# Patient Record
Sex: FEMALE | Race: WHITE | NOT HISPANIC OR LATINO | Employment: UNEMPLOYED | URBAN - METROPOLITAN AREA
[De-identification: names, ages, dates, MRNs, and addresses within clinical notes are randomized per-mention and may not be internally consistent; named-entity substitution may affect disease eponyms.]

---

## 2023-10-01 PROBLEM — N95.1 MENOPAUSAL SYMPTOM: Status: ACTIVE | Noted: 2023-10-01

## 2023-10-01 PROBLEM — S66.819A SPRAIN AND STRAIN OF INTERPHALANGEAL (JOINT) OF HAND: Status: ACTIVE | Noted: 2023-10-01

## 2023-10-01 PROBLEM — S69.90XA FINGER INJURY: Status: ACTIVE | Noted: 2023-10-01

## 2023-10-01 PROBLEM — E88.810 DYSMETABOLIC SYNDROME X: Status: ACTIVE | Noted: 2023-10-01

## 2023-10-01 PROBLEM — S63.639A SPRAIN AND STRAIN OF INTERPHALANGEAL (JOINT) OF HAND: Status: ACTIVE | Noted: 2023-10-01

## 2023-10-01 PROBLEM — L90.0 LICHEN SCLEROSUS: Status: ACTIVE | Noted: 2023-08-09

## 2023-10-01 PROBLEM — N64.4 BREAST PAIN: Status: ACTIVE | Noted: 2023-10-01

## 2023-10-01 PROBLEM — L68.0 HIRSUTISM: Status: ACTIVE | Noted: 2023-08-09

## 2023-10-01 PROBLEM — M51.369 LUMBAR DEGENERATIVE DISC DISEASE: Status: ACTIVE | Noted: 2023-10-01

## 2023-10-01 PROBLEM — R60.9 EDEMA: Status: ACTIVE | Noted: 2023-10-01

## 2023-10-01 PROBLEM — E78.5 DYSLIPIDEMIA: Status: ACTIVE | Noted: 2023-10-01

## 2023-10-01 PROBLEM — J32.2 CHRONIC ETHMOIDAL SINUSITIS: Status: ACTIVE | Noted: 2023-10-01

## 2023-10-01 PROBLEM — M54.9 BACK PAIN, CHRONIC: Status: ACTIVE | Noted: 2023-10-01

## 2023-10-01 PROBLEM — F33.40 RECURRENT MAJOR DEPRESSIVE DISORDER IN REMISSION (CMS-HCC): Status: ACTIVE | Noted: 2023-10-01

## 2023-10-01 PROBLEM — G89.29 BACK PAIN, CHRONIC: Status: ACTIVE | Noted: 2023-10-01

## 2023-10-01 PROBLEM — M25.569 JOINT PAIN, KNEE: Status: ACTIVE | Noted: 2023-10-01

## 2023-10-01 PROBLEM — N64.89 PENDULOUS BREAST: Status: ACTIVE | Noted: 2023-10-01

## 2023-10-01 PROBLEM — K21.9 ESOPHAGEAL REFLUX: Status: ACTIVE | Noted: 2023-10-01

## 2023-10-01 PROBLEM — F41.0 PANIC ATTACKS: Status: ACTIVE | Noted: 2023-10-01

## 2023-10-01 PROBLEM — M25.551 RIGHT HIP PAIN: Status: ACTIVE | Noted: 2023-10-01

## 2023-10-01 PROBLEM — E05.90 HYPERTHYROIDISM: Status: ACTIVE | Noted: 2023-10-01

## 2023-10-01 PROBLEM — M25.511 RIGHT SHOULDER PAIN: Status: ACTIVE | Noted: 2023-10-01

## 2023-10-01 PROBLEM — H81.10 BPPV (BENIGN PAROXYSMAL POSITIONAL VERTIGO): Status: ACTIVE | Noted: 2023-10-01

## 2023-10-01 PROBLEM — M54.9 BACKACHE: Status: ACTIVE | Noted: 2023-10-01

## 2023-10-01 PROBLEM — I82.409 DVT (DEEP VENOUS THROMBOSIS) (MULTI): Status: ACTIVE | Noted: 2023-10-01

## 2023-10-01 PROBLEM — R00.2 HEART PALPITATIONS: Status: ACTIVE | Noted: 2023-10-01

## 2023-10-01 PROBLEM — N64.4 BREAST PAIN: Status: RESOLVED | Noted: 2023-10-01 | Resolved: 2023-10-01

## 2023-10-01 PROBLEM — M67.951 TENDINOPATHY OF RIGHT GLUTEUS MEDIUS: Status: ACTIVE | Noted: 2023-10-01

## 2023-10-01 PROBLEM — F41.9 ANXIETY: Status: ACTIVE | Noted: 2023-10-01

## 2023-10-01 PROBLEM — F32.A DEPRESSION: Status: ACTIVE | Noted: 2023-10-01

## 2023-10-01 PROBLEM — I87.1 COMPRESSION OF VEIN: Status: ACTIVE | Noted: 2023-10-01

## 2023-10-01 PROBLEM — G47.00 INSOMNIA: Status: ACTIVE | Noted: 2023-10-01

## 2023-10-01 PROBLEM — N63.20 LEFT BREAST MASS: Status: ACTIVE | Noted: 2023-10-01

## 2023-10-01 PROBLEM — B35.1 TINEA UNGUIUM: Status: ACTIVE | Noted: 2023-08-09

## 2023-10-01 PROBLEM — J34.2 DEVIATED NASAL SEPTUM: Status: ACTIVE | Noted: 2023-10-01

## 2023-10-01 PROBLEM — J31.0 CHRONIC RHINITIS: Status: ACTIVE | Noted: 2023-10-01

## 2023-10-01 PROBLEM — R92.8 ABNORMAL MAMMOGRAM: Status: ACTIVE | Noted: 2023-10-01

## 2023-10-01 PROBLEM — E05.90 SUBCLINICAL HYPERTHYROIDISM: Status: ACTIVE | Noted: 2023-10-01

## 2023-10-01 PROBLEM — U07.1 DISEASE DUE TO SEVERE ACUTE RESPIRATORY SYNDROME CORONAVIRUS 2 (SARS-COV-2): Status: ACTIVE | Noted: 2023-10-01

## 2023-10-01 PROBLEM — M25.512 LEFT SHOULDER PAIN: Status: ACTIVE | Noted: 2023-10-01

## 2023-10-01 PROBLEM — N90.89 VULVAR MASS: Status: ACTIVE | Noted: 2023-10-01

## 2023-10-01 PROBLEM — I10 HYPERTENSION: Status: ACTIVE | Noted: 2023-10-01

## 2023-10-01 PROBLEM — M79.606 PAIN OF LOWER EXTREMITY: Status: ACTIVE | Noted: 2023-10-01

## 2023-10-01 PROBLEM — D18.01 HEMANGIOMA OF SKIN AND SUBCUTANEOUS TISSUE: Status: ACTIVE | Noted: 2023-08-09

## 2023-10-01 PROBLEM — D22.70 MELANOCYTIC NEVI OF LOWER LIMB, INCLUDING HIP: Status: ACTIVE | Noted: 2023-08-09

## 2023-10-01 PROBLEM — R73.02 IGT (IMPAIRED GLUCOSE TOLERANCE): Status: ACTIVE | Noted: 2023-10-01

## 2023-10-01 PROBLEM — E28.2 POLYCYSTIC OVARIAN SYNDROME: Status: ACTIVE | Noted: 2023-10-01

## 2023-10-01 PROBLEM — N95.2 VAGINAL ATROPHY: Status: ACTIVE | Noted: 2023-10-01

## 2023-10-01 PROBLEM — R09.82 POSTNASAL DRIP: Status: ACTIVE | Noted: 2023-10-01

## 2023-10-01 PROBLEM — K64.9 HEMORRHOIDS: Status: ACTIVE | Noted: 2023-10-01

## 2023-10-01 PROBLEM — R07.89 ATYPICAL CHEST PAIN: Status: ACTIVE | Noted: 2023-10-01

## 2023-10-01 PROBLEM — Z04.9 CONDITION NOT FOUND: Status: ACTIVE | Noted: 2023-10-01

## 2023-10-01 PROBLEM — M54.32 SCIATICA OF LEFT SIDE: Status: ACTIVE | Noted: 2023-10-01

## 2023-10-01 PROBLEM — L82.1 OTHER SEBORRHEIC KERATOSIS: Status: ACTIVE | Noted: 2023-08-09

## 2023-10-01 PROBLEM — M54.16 LUMBAR RADICULOPATHY, RIGHT: Status: ACTIVE | Noted: 2023-10-01

## 2023-10-01 PROBLEM — M25.519 SHOULDER PAIN: Status: ACTIVE | Noted: 2023-10-01

## 2023-10-01 PROBLEM — R39.9 UTI SYMPTOMS: Status: ACTIVE | Noted: 2023-10-01

## 2023-10-01 PROBLEM — L72.3 SEBACEOUS CYST: Status: ACTIVE | Noted: 2023-10-01

## 2023-10-01 PROBLEM — L81.4 OTHER MELANIN HYPERPIGMENTATION: Status: ACTIVE | Noted: 2023-08-09

## 2023-10-01 PROBLEM — N95.1 HOT FLASHES DUE TO MENOPAUSE: Status: ACTIVE | Noted: 2023-10-01

## 2023-10-01 PROBLEM — H73.892: Status: ACTIVE | Noted: 2023-10-01

## 2023-10-01 PROBLEM — M79.2 NEUROPATHIC PAIN: Status: ACTIVE | Noted: 2023-10-01

## 2023-10-01 PROBLEM — E66.9 OBESITY: Status: ACTIVE | Noted: 2023-10-01

## 2023-10-01 PROBLEM — D22.5 MELANOCYTIC NEVI OF TRUNK: Status: ACTIVE | Noted: 2023-08-09

## 2023-10-01 PROBLEM — S62.609A: Status: ACTIVE | Noted: 2023-10-01

## 2023-10-01 PROBLEM — N95.1 MENOPAUSAL STATE: Status: ACTIVE | Noted: 2023-10-01

## 2023-10-01 PROBLEM — M75.80 ROTATOR CUFF TENDONITIS: Status: ACTIVE | Noted: 2023-10-01

## 2023-10-01 PROBLEM — D22.60 MELANOCYTIC NEVI OF UNSPECIFIED UPPER LIMB, INCLUDING SHOULDER: Status: ACTIVE | Noted: 2023-08-09

## 2023-10-01 PROBLEM — M51.36 LUMBAR DEGENERATIVE DISC DISEASE: Status: ACTIVE | Noted: 2023-10-01

## 2023-10-01 RX ORDER — HYDROCORTISONE 25 MG/G
CREAM TOPICAL
COMMUNITY
Start: 2020-10-26

## 2023-10-01 RX ORDER — HYDROCHLOROTHIAZIDE 25 MG/1
1 TABLET ORAL DAILY
COMMUNITY
Start: 2021-01-04

## 2023-10-01 RX ORDER — ESTRADIOL 0.1 MG/G
CREAM VAGINAL DAILY
COMMUNITY
End: 2023-11-03 | Stop reason: SDUPTHER

## 2023-10-01 RX ORDER — SEMAGLUTIDE 1.34 MG/ML
INJECTION, SOLUTION SUBCUTANEOUS
COMMUNITY
Start: 2023-04-21

## 2023-10-01 RX ORDER — SEMAGLUTIDE 1.34 MG/ML
0.5 INJECTION, SOLUTION SUBCUTANEOUS
COMMUNITY
Start: 2022-07-29 | End: 2023-10-04 | Stop reason: ALTCHOICE

## 2023-10-01 RX ORDER — TERBINAFINE HYDROCHLORIDE 250 MG/1
TABLET ORAL
COMMUNITY
Start: 2023-08-16 | End: 2023-10-04 | Stop reason: ALTCHOICE

## 2023-10-01 RX ORDER — ASPIRIN 81 MG/1
TABLET ORAL
COMMUNITY
Start: 2019-05-22

## 2023-10-01 RX ORDER — HYDROXYZINE PAMOATE 25 MG/1
CAPSULE ORAL
COMMUNITY
Start: 2023-01-26 | End: 2023-10-04 | Stop reason: ALTCHOICE

## 2023-10-01 RX ORDER — CHOLECALCIFEROL (VITAMIN D3) 25 MCG
25 TABLET ORAL DAILY
COMMUNITY
End: 2023-10-04 | Stop reason: ALTCHOICE

## 2023-10-01 RX ORDER — MELOXICAM 15 MG/1
1 TABLET ORAL DAILY PRN
COMMUNITY
Start: 2022-10-07 | End: 2023-10-04 | Stop reason: ALTCHOICE

## 2023-10-01 RX ORDER — ESOMEPRAZOLE MAGNESIUM 40 MG/1
40 CAPSULE, DELAYED RELEASE ORAL DAILY
COMMUNITY
End: 2023-11-06 | Stop reason: SDUPTHER

## 2023-10-01 RX ORDER — CALCIUM CARBONATE/VITAMIN D3 600 MG-10
TABLET ORAL
COMMUNITY
End: 2023-10-04 | Stop reason: ALTCHOICE

## 2023-10-01 RX ORDER — CLOBETASOL PROPIONATE 0.5 MG/G
CREAM TOPICAL
COMMUNITY
Start: 2020-09-30

## 2023-10-01 RX ORDER — ATORVASTATIN CALCIUM 10 MG/1
1 TABLET, FILM COATED ORAL NIGHTLY
COMMUNITY
Start: 2021-05-24 | End: 2024-03-04 | Stop reason: SDUPTHER

## 2023-10-01 RX ORDER — GABAPENTIN 300 MG/1
300 CAPSULE ORAL 3 TIMES DAILY
COMMUNITY
Start: 2022-10-06 | End: 2023-10-04 | Stop reason: ALTCHOICE

## 2023-10-01 RX ORDER — FLUTICASONE PROPIONATE 50 MCG
2 SPRAY, SUSPENSION (ML) NASAL DAILY PRN
COMMUNITY
Start: 2022-09-01

## 2023-10-01 RX ORDER — RAMELTEON 8 MG/1
TABLET ORAL
COMMUNITY
Start: 2021-07-21 | End: 2023-10-04 | Stop reason: ALTCHOICE

## 2023-10-01 RX ORDER — MULTIVITAMIN
1 TABLET ORAL DAILY
COMMUNITY
Start: 2017-02-20

## 2023-10-01 RX ORDER — ALPRAZOLAM 0.5 MG/1
0.25 TABLET ORAL DAILY PRN
COMMUNITY
Start: 2022-09-30 | End: 2023-10-04 | Stop reason: HOSPADM

## 2023-10-01 RX ORDER — HYDROXYZINE HYDROCHLORIDE 25 MG/1
1-2 TABLET, FILM COATED ORAL NIGHTLY PRN
COMMUNITY
Start: 2022-05-27 | End: 2023-10-04 | Stop reason: ALTCHOICE

## 2023-10-01 RX ORDER — POTASSIUM CHLORIDE 750 MG/1
1 TABLET, EXTENDED RELEASE ORAL DAILY
COMMUNITY
Start: 2021-05-24

## 2023-10-01 RX ORDER — ESTRADIOL 10 UG/1
INSERT VAGINAL
COMMUNITY
Start: 2022-12-28 | End: 2023-10-04 | Stop reason: ALTCHOICE

## 2023-10-01 RX ORDER — OXYBUTYNIN CHLORIDE 5 MG/1
TABLET ORAL
COMMUNITY
Start: 2023-03-30 | End: 2024-03-04 | Stop reason: SDUPTHER

## 2023-10-01 RX ORDER — MELOXICAM 7.5 MG/1
7.5 TABLET ORAL 2 TIMES DAILY
COMMUNITY
Start: 2022-01-21 | End: 2023-10-04 | Stop reason: ALTCHOICE

## 2023-10-01 RX ORDER — LOSARTAN POTASSIUM 25 MG/1
25 TABLET ORAL DAILY
COMMUNITY
Start: 2019-02-01 | End: 2023-10-04 | Stop reason: ALTCHOICE

## 2023-10-04 ENCOUNTER — OFFICE VISIT (OUTPATIENT)
Dept: OBSTETRICS AND GYNECOLOGY | Facility: CLINIC | Age: 58
End: 2023-10-04
Payer: COMMERCIAL

## 2023-10-04 VITALS
HEIGHT: 64 IN | DIASTOLIC BLOOD PRESSURE: 60 MMHG | WEIGHT: 148 LBS | SYSTOLIC BLOOD PRESSURE: 110 MMHG | BODY MASS INDEX: 25.27 KG/M2

## 2023-10-04 DIAGNOSIS — N90.89 PERINEAL CYST IN FEMALE: Primary | ICD-10-CM

## 2023-10-04 DIAGNOSIS — Z71.89 SURGICAL COUNSELING VISIT: ICD-10-CM

## 2023-10-04 PROCEDURE — 1036F TOBACCO NON-USER: CPT | Performed by: STUDENT IN AN ORGANIZED HEALTH CARE EDUCATION/TRAINING PROGRAM

## 2023-10-04 PROCEDURE — 3074F SYST BP LT 130 MM HG: CPT | Performed by: STUDENT IN AN ORGANIZED HEALTH CARE EDUCATION/TRAINING PROGRAM

## 2023-10-04 PROCEDURE — 99204 OFFICE O/P NEW MOD 45 MIN: CPT | Performed by: STUDENT IN AN ORGANIZED HEALTH CARE EDUCATION/TRAINING PROGRAM

## 2023-10-04 PROCEDURE — 3078F DIAST BP <80 MM HG: CPT | Performed by: STUDENT IN AN ORGANIZED HEALTH CARE EDUCATION/TRAINING PROGRAM

## 2023-10-04 ASSESSMENT — PAIN SCALES - GENERAL: PAINLEVEL: 0-NO PAIN

## 2023-10-04 NOTE — PROGRESS NOTES
"Division of Minimally Invasive Gynecologic Surgery  [facility]    10/04/23 Gynecology Consult     HISTORY OF PRESENT ILLNESS:  Edna Dawson 58 y.o. w/ hx of DVT and known lichen sclerosis presents in referral from Rad Garcia for small bump in soft tissue of right vaginal canal. She also inquired today about risk-reducing hysterectomy/oophorectomy.      She reports she first noticed this in August of this year. It is \"about the size of a pea\" and hard. No change in size since detection and no pain or tenderness.     PAST MEDICAL HISTORY:  Past Medical History:   Diagnosis Date    Metabolic syndrome     Dysmetabolic syndrome X    Personal history of other diseases of the digestive system     History of gastroesophageal reflux (GERD)    Personal history of other diseases of the musculoskeletal system and connective tissue     History of arthritis    Personal history of other venous thrombosis and embolism 2015    History of deep venous thrombosis       PAST SURGICAL HISTORY:  Past Surgical History:   Procedure Laterality Date    APPENDECTOMY  2013    Appendectomy    BLADDER SURGERY  2015    Bladder Surgery     SECTION, CLASSIC  2015     Section    OTHER SURGICAL HISTORY  2019    Dilation and curettage    OTHER SURGICAL HISTORY  2019    [place] tooth extraction       SOCIAL HISTORY:  Social History     Tobacco Use    Smoking status: Never    Smokeless tobacco: Never   Substance Use Topics    Drug use: Never       PHYSICAL EXAMINATION:  VITAL SIGNS:  /60 (BP Location: Right arm)   Ht 1.626 m (5' 4\")   Wt 67.1 kg (148 lb)   BMI 25.40 kg/m²       Constitutional:  No acute distress, well-nourished and well-developed  HEENT: EOM grossly intact, MMM, neck supple and with full ROM  Pulm:  Effort normal. No accessory muscle usage.  No respiratory distress.  Abd: soft, non-distended, non-tender, no palpable masses  :  - EGBUS: " grossly WNL, subcentimeter firm/non-tender/mobile mass palpated within soft tissue of right vaginal canal near introital edge  - Bimanual: No adnexal fullness or tenderness, no CMT, uterus is small, mobile, non-tender, no fullness or nodularity of parametria, bilateral lymph nodes WNL   Neurological:  She is alert and oriented to person place and time.  Skin: Warm, no pallor.  Psychiatric:  She has normal mood and affect    ASSESSMENT:    Problem List Items Addressed This Visit    None  Visit Diagnoses       Perineal cyst in female    -  Primary    Surgical counseling visit                Edna Dawson 58 y.o. w/ hx of DVT and known lichen sclerosis presents in referral from Rad Garcia for small bump in soft tissue of right vaginal canal. She also inquired today about risk-reducing hysterectomy/oophorectomy.      Reviewed findings on exam today, mass likely benign given exam features. Encouraged bimonthly self-exam for monitoring, with consideration for repeat visit/CT imaging if she notices new findings such as pain, bleeding, or an increase in size.     Discussed typical indications for risk-reducing TLH-BSO. She does not have a significant family history to increase this risk, nor does she have a genetic syndrome. In addition, surgery would pose a risk of recurrent DVT for her with possibility of PE. We also discussed that ovarian function is still beneficial for cardiovascular/bone/brain health up until age 65. She expressed understanding, will defer TLH-BSO.     Time for chart review, consultation, and documentation today was 45 minutes with the majority of the time spent in counseling.     Billie John MD  10/04/23  9:18 AM

## 2023-10-13 DIAGNOSIS — Z00.00 ENCOUNTER FOR PREVENTIVE HEALTH EXAMINATION: Primary | ICD-10-CM

## 2023-11-01 ENCOUNTER — OFFICE VISIT (OUTPATIENT)
Dept: OBSTETRICS AND GYNECOLOGY | Facility: CLINIC | Age: 58
End: 2023-11-01
Payer: COMMERCIAL

## 2023-11-01 VITALS
BODY MASS INDEX: 25.78 KG/M2 | SYSTOLIC BLOOD PRESSURE: 120 MMHG | HEIGHT: 64 IN | DIASTOLIC BLOOD PRESSURE: 68 MMHG | WEIGHT: 151 LBS

## 2023-11-01 DIAGNOSIS — Z01.419 ENCOUNTER FOR ANNUAL ROUTINE GYNECOLOGICAL EXAMINATION: Primary | ICD-10-CM

## 2023-11-01 PROCEDURE — 3074F SYST BP LT 130 MM HG: CPT | Performed by: OBSTETRICS & GYNECOLOGY

## 2023-11-01 PROCEDURE — 99396 PREV VISIT EST AGE 40-64: CPT | Performed by: OBSTETRICS & GYNECOLOGY

## 2023-11-01 PROCEDURE — 1036F TOBACCO NON-USER: CPT | Performed by: OBSTETRICS & GYNECOLOGY

## 2023-11-01 PROCEDURE — 3078F DIAST BP <80 MM HG: CPT | Performed by: OBSTETRICS & GYNECOLOGY

## 2023-11-01 ASSESSMENT — PAIN SCALES - GENERAL: PAINLEVEL: 0-NO PAIN

## 2023-11-01 NOTE — PATIENT INSTRUCTIONS
Thanks for coming in today for your annual GYN exam.    Your 2022 Pap was within normal limits with negative HPV testing.  Your next Pap smear is due in 2027.  However, please return to the office once a year for your annual GYN exam.    Have a mammogram performed once a year.    Follow-up with your PCP and other healthcare specialist as needed.    Feel free to call the office with any problems, questions or concerns prior to your next scheduled visit.

## 2023-11-01 NOTE — PROGRESS NOTES
"58-year-old -2-2-3  woman presents today for annual GYN exam.    She has a history of biopsy-proven lichen sclerosis.  She is alternating estradiol and clobetasol for her lichen sclerosis.    GynHx: Menarche began at age 11.  She denies any STIs, PID, abnormal Pap smears or sexual abuse.  She is a history of infertility with assistance to conceive 2 children.  She is sexually active with 1 male partner, her  who had a vasectomy.  She denies any history of sexual abuse.    OBHx:  x1 at 35 weeks followed by  section at 33 weeks for placenta previa.  She then had an SAB at 6 weeks and then a  at 38 weeks.  She had another S AB managed with D&C.      Objective   /68   Ht 1.626 m (5' 4\")   Wt 68.5 kg (151 lb)   LMP 2022   BMI 25.92 kg/m²     General:   alert and oriented, in no acute distress, appears stated age, and cooperative   Heart: regular rate and rhythm, S1, S2 normal, no murmur, click, rub or gallop   Lungs: clear to auscultation bilaterally   Abdomen: soft, non-tender, without masses or organomegaly   Vulva: Bartholin's, Urethra, Rotan's normal, 4mm, NT, firm cyst of the inner right labial minora   Vagina: normal mucosa   Cervix: no cervical motion tenderness   Uterus: normal shape and consistency   Adnexa: normal adnexa   Breast: WNL    APE     -  Pap due     -  Mammogram     -  Maintain Clobetasol and E2  "

## 2023-11-03 DIAGNOSIS — N95.2 VAGINAL ATROPHY: Primary | ICD-10-CM

## 2023-11-03 RX ORDER — ESTRADIOL 0.1 MG/G
CREAM VAGINAL
Qty: 42.5 G | Refills: 2 | Status: SHIPPED | OUTPATIENT
Start: 2023-11-03 | End: 2024-06-06 | Stop reason: SDUPTHER

## 2023-11-06 ENCOUNTER — OFFICE VISIT (OUTPATIENT)
Dept: PRIMARY CARE | Facility: CLINIC | Age: 58
End: 2023-11-06
Payer: COMMERCIAL

## 2023-11-06 VITALS
OXYGEN SATURATION: 99 % | BODY MASS INDEX: 25.93 KG/M2 | SYSTOLIC BLOOD PRESSURE: 120 MMHG | WEIGHT: 151.9 LBS | DIASTOLIC BLOOD PRESSURE: 80 MMHG | HEIGHT: 64 IN | HEART RATE: 66 BPM | RESPIRATION RATE: 16 BRPM

## 2023-11-06 DIAGNOSIS — I10 PRIMARY HYPERTENSION: ICD-10-CM

## 2023-11-06 DIAGNOSIS — K21.9 GASTROESOPHAGEAL REFLUX DISEASE WITHOUT ESOPHAGITIS: Primary | ICD-10-CM

## 2023-11-06 PROCEDURE — 3074F SYST BP LT 130 MM HG: CPT | Performed by: INTERNAL MEDICINE

## 2023-11-06 PROCEDURE — 99214 OFFICE O/P EST MOD 30 MIN: CPT | Performed by: INTERNAL MEDICINE

## 2023-11-06 PROCEDURE — 3079F DIAST BP 80-89 MM HG: CPT | Performed by: INTERNAL MEDICINE

## 2023-11-06 PROCEDURE — 1036F TOBACCO NON-USER: CPT | Performed by: INTERNAL MEDICINE

## 2023-11-06 RX ORDER — ESOMEPRAZOLE MAGNESIUM 40 MG/1
40 CAPSULE, DELAYED RELEASE ORAL DAILY
Qty: 90 CAPSULE | Refills: 0 | Status: SHIPPED | OUTPATIENT
Start: 2023-11-06

## 2023-11-06 NOTE — PROGRESS NOTES
"Sick Visit/Cough     Rose is a pleasant 58-year-old female here for evaluation of cough.  She has had a longstanding cough that comes and goes, usually gets worse when she lays flat at bedtime.  Goes away during the day.  No chest pain shortness of breath wheezing.  Has had acid reflux in the past but denies any daily symptoms.  She also has had extensive ENT work-up, has dry nose but denies any daily runny nose, postnasal drip.  No sinus pain.  No chest pain or shortness of breath no orthopnea no PND.  No symptoms with activity.  No abdominal pain.  No nausea vomiting.  No anorexia, no unintentional weight loss.     Blood pressure 120/80, pulse 66, resp. rate 16, height 1.626 m (5' 4\"), weight 68.9 kg (151 lb 14.4 oz), last menstrual period 03/25/2022, SpO2 99 %.    Calm coherent and appropriate    Neck is supple and none tender    Breathing comfortably, clear to auscultation bilaterally    Regular rate rhythm, no murmur gallops or rubs.  Good distal pulses.  No edema.  No JVD.    Abdomen soft and nontender, normal bowel sounds.    Extremities warm well perfused with no clubbing or cyanosis    Cognition intact.      Assessment plan: Rose is a pleasant 58-year-old female here for evaluation of cough, symptoms are consistent with acid reflux versus postnasal drip.  Initially we will start her on esomeprazole 40 mg daily, empty stomach nothing to eat for 40 minutes.  We will give this about 4 weeks to see how she responds, if no improvement will do start her on Flonase.  Call with any new symptoms.    Hypertension: Excellent control, continue HCTZ.    Obesity: Insurance unfortunately no longer appears to be covering Ozempic.  We discussed importance of portion control.  Already has healthy lifestyle otherwise.    Recurrent DVTs: Has had extensive negative work-up.  On aspirin.    Follow-up 3 months.      "

## 2023-11-07 ENCOUNTER — LAB (OUTPATIENT)
Dept: LAB | Facility: LAB | Age: 58
End: 2023-11-07
Payer: COMMERCIAL

## 2023-11-07 DIAGNOSIS — Z00.00 ENCOUNTER FOR PREVENTIVE HEALTH EXAMINATION: ICD-10-CM

## 2023-11-07 LAB
25(OH)D3 SERPL-MCNC: 39 NG/ML (ref 30–100)
ALBUMIN SERPL BCP-MCNC: 4.5 G/DL (ref 3.4–5)
ALP SERPL-CCNC: 81 U/L (ref 33–110)
ALT SERPL W P-5'-P-CCNC: 29 U/L (ref 7–45)
ANION GAP SERPL CALC-SCNC: 14 MMOL/L (ref 10–20)
AST SERPL W P-5'-P-CCNC: 23 U/L (ref 9–39)
BILIRUB SERPL-MCNC: 0.8 MG/DL (ref 0–1.2)
BUN SERPL-MCNC: 16 MG/DL (ref 6–23)
CALCIUM SERPL-MCNC: 10 MG/DL (ref 8.6–10.3)
CHLORIDE SERPL-SCNC: 100 MMOL/L (ref 98–107)
CHOLEST SERPL-MCNC: 179 MG/DL (ref 0–199)
CHOLESTEROL/HDL RATIO: 1.9
CO2 SERPL-SCNC: 30 MMOL/L (ref 21–32)
CREAT SERPL-MCNC: 0.95 MG/DL (ref 0.5–1.05)
ERYTHROCYTE [DISTWIDTH] IN BLOOD BY AUTOMATED COUNT: 12.1 % (ref 11.5–14.5)
EST. AVERAGE GLUCOSE BLD GHB EST-MCNC: 85 MG/DL
GFR SERPL CREATININE-BSD FRML MDRD: 70 ML/MIN/1.73M*2
GLUCOSE SERPL-MCNC: 92 MG/DL (ref 74–99)
HBA1C MFR BLD: 4.6 %
HCT VFR BLD AUTO: 37.6 % (ref 36–46)
HDLC SERPL-MCNC: 92.8 MG/DL
HGB BLD-MCNC: 13.1 G/DL (ref 12–16)
LDLC SERPL CALC-MCNC: 69 MG/DL
MCH RBC QN AUTO: 31.6 PG (ref 26–34)
MCHC RBC AUTO-ENTMCNC: 34.8 G/DL (ref 32–36)
MCV RBC AUTO: 91 FL (ref 80–100)
NON HDL CHOLESTEROL: 86 MG/DL (ref 0–149)
NRBC BLD-RTO: 0 /100 WBCS (ref 0–0)
PLATELET # BLD AUTO: 319 X10*3/UL (ref 150–450)
POTASSIUM SERPL-SCNC: 4.5 MMOL/L (ref 3.5–5.3)
PROT SERPL-MCNC: 6.6 G/DL (ref 6.4–8.2)
RBC # BLD AUTO: 4.15 X10*6/UL (ref 4–5.2)
SODIUM SERPL-SCNC: 139 MMOL/L (ref 136–145)
TRIGL SERPL-MCNC: 85 MG/DL (ref 0–149)
VLDL: 17 MG/DL (ref 0–40)
WBC # BLD AUTO: 6.4 X10*3/UL (ref 4.4–11.3)

## 2023-11-07 PROCEDURE — 85027 COMPLETE CBC AUTOMATED: CPT

## 2023-11-07 PROCEDURE — 36415 COLL VENOUS BLD VENIPUNCTURE: CPT

## 2023-11-07 PROCEDURE — 80061 LIPID PANEL: CPT

## 2023-11-07 PROCEDURE — 80053 COMPREHEN METABOLIC PANEL: CPT

## 2023-11-07 PROCEDURE — 83036 HEMOGLOBIN GLYCOSYLATED A1C: CPT

## 2023-11-07 PROCEDURE — 82306 VITAMIN D 25 HYDROXY: CPT

## 2023-12-22 DIAGNOSIS — Z12.11 ENCOUNTER FOR SCREENING COLONOSCOPY: Primary | ICD-10-CM

## 2024-01-08 ENCOUNTER — OFFICE VISIT (OUTPATIENT)
Dept: ORTHOPEDIC SURGERY | Facility: HOSPITAL | Age: 59
End: 2024-01-08
Payer: COMMERCIAL

## 2024-01-08 DIAGNOSIS — M67.951 TENDINOPATHY OF RIGHT GLUTEUS MEDIUS: Primary | ICD-10-CM

## 2024-01-08 PROCEDURE — 99213 OFFICE O/P EST LOW 20 MIN: CPT | Performed by: EMERGENCY MEDICINE

## 2024-01-08 PROCEDURE — 1036F TOBACCO NON-USER: CPT | Performed by: EMERGENCY MEDICINE

## 2024-01-08 NOTE — PROGRESS NOTES
Subjective   Edna Dawson is a 58 y.o. female who presents for Follow-up of the Right Hip    HPI  1/8/24: Patient returns today for reevaluation for right hip pain.  She was seen on 1/21/2022 and diagnosed with gluteus medius tendinitis.  I did perform a gluteus medius insertion injection for her at that time.  She felt that the injection did provide significant relief.  She was doing quite well up until last month when she developed worsening lateral hip pain.  Her pain has since improved and she is nearly asymptomatic at this time.  She has been working on daily exercises with significant improvement.  No additional complaints.    1/21/22: 57-year-old female presents with complaint of right lateral hip pain that she has had for approximately 9 months. This pain is of gradual onset and intermittent duration, of mild severity. Pain is exacerbated by walking, laying on her right side, and crossing her leg over while performing yoga. Pain is alleviated by rest. Pain is associated with a throbbing and pulling sensation. Pain is associated with acute trauma, deformity, ecchymosis, paresthesias, weakness, rash, erythema, or fevers. She has been taking OTC Motrin, the provides some relief.       ROS: All pertinent positive symptoms are included in the history of present illness.    All other systems have been reviewed and are negative and noncontributory to this patient's current ailments.    Objective     There were no vitals filed for this visit.    Physical Exam  General/Constitutional: No apparent distress. Well-nourished and well developed.  Head: Normocephalic, Atraumatic.   Eyes: EOMI.  Vascular: No edema, swelling or tenderness, except as noted in detailed exam.  Respiratory: Non-labored breathing.  Integumentary: No impressive skin lesions present, except as noted in detailed exam.  Neurological: Alert and oriented.  Psychological: Normal mood and affect.  Musculoskeletal: Normal, except as noted in detailed  exam.     Right Hip: Appearance: Normal. Tenderness: None except the gluteus minimus and greater trochanter and bursa. ROM: Full. Flexion was 5/5. Extension was 5/5. Internal rotation was 5/5. External rotation was 5/5. Abduction was 5/5. Adduction was 5/5. Special Tests: positive Connie's test and positive Blake' test, but negative CARLITOS test, negative impingement test, no joint laxity, negative Sacroiliac Compression test and negative Straight Leg Raise.         Assessment/Plan   Problem List Items Addressed This Visit       Tendinopathy of right gluteus medius - Primary     Considering that she is doing well and is nearly asymptomatic today, I do not feel that additional imaging is necessary.  Examination is reassuring.  She does have mild tenderness over the peritrochanteric region, however feels that she is improving.  Considering this, I do not feel that further intervention is necessary at this time.  I did discuss with patient that she can use topical Voltaren gel as needed for additional pain control.  I would like to see her back in the future if pain returns.  Otherwise, she can progress to all activities as tolerated.    Logan Seth, DO  Sports Medicine  [facility]     ** Please excuse any errors in grammar or translation related to this dictation. Voice recognition software was utilized to prepare this document. **

## 2024-01-22 ENCOUNTER — HOSPITAL ENCOUNTER (OUTPATIENT)
Dept: RADIOLOGY | Facility: CLINIC | Age: 59
Discharge: HOME | End: 2024-01-22
Payer: COMMERCIAL

## 2024-01-22 ENCOUNTER — OFFICE VISIT (OUTPATIENT)
Dept: PRIMARY CARE | Facility: CLINIC | Age: 59
End: 2024-01-22
Payer: COMMERCIAL

## 2024-01-22 VITALS
OXYGEN SATURATION: 97 % | WEIGHT: 157.6 LBS | RESPIRATION RATE: 16 BRPM | HEIGHT: 64 IN | BODY MASS INDEX: 26.91 KG/M2 | DIASTOLIC BLOOD PRESSURE: 90 MMHG | HEART RATE: 64 BPM | SYSTOLIC BLOOD PRESSURE: 140 MMHG

## 2024-01-22 DIAGNOSIS — R05.3 CHRONIC COUGH: ICD-10-CM

## 2024-01-22 DIAGNOSIS — R05.3 CHRONIC COUGH: Primary | ICD-10-CM

## 2024-01-22 PROCEDURE — 99213 OFFICE O/P EST LOW 20 MIN: CPT | Performed by: INTERNAL MEDICINE

## 2024-01-22 PROCEDURE — 3080F DIAST BP >= 90 MM HG: CPT | Performed by: INTERNAL MEDICINE

## 2024-01-22 PROCEDURE — 71046 X-RAY EXAM CHEST 2 VIEWS: CPT

## 2024-01-22 PROCEDURE — 1036F TOBACCO NON-USER: CPT | Performed by: INTERNAL MEDICINE

## 2024-01-22 PROCEDURE — 71046 X-RAY EXAM CHEST 2 VIEWS: CPT | Performed by: RADIOLOGY

## 2024-01-22 PROCEDURE — 3077F SYST BP >= 140 MM HG: CPT | Performed by: INTERNAL MEDICINE

## 2024-01-22 ASSESSMENT — ENCOUNTER SYMPTOMS
CHILLS: 0
SHORTNESS OF BREATH: 0
MYALGIAS: 0
COUGH: 1
SORE THROAT: 0
FEVER: 0
HEMOPTYSIS: 0
WEIGHT LOSS: 0
WHEEZING: 0
RHINORRHEA: 0
HEARTBURN: 0
HEADACHES: 0

## 2024-01-22 NOTE — PROGRESS NOTES
"Sick Visit/Cough     Rose is a pleasant 59-year-old female here for evaluation of cough.  She has had a longstanding cough that comes and goes, happens throughout the day, it is dry, she feels like it is from the tickling of the back of her throat that makes her cough.  But denies any sinus pain, no sinus drainage, does feel like the ears feel full and she has some postnasal drip.  Flonase did not help.  Azelastine did not help.  No chest pain shortness of breath wheezing.  She exercises without any limitation.  No cough with exercise.  She has no reflux-like symptoms.  We did try her on PPI without improvement in symptoms.  She also has had inhaler for presumed cough variant asthma without improvement.  She has had ENT workup in the past, I do not have the access, she has made appointment with Dr. Simental for February.  She feels like a lot of this is coming from her nose.    She denies any fevers or chills.  No night sweats.  No weight loss.  Physically active asymptomatic.  No chest pain shortness of breath.  No wheezing.  No abdominal pain.  No dysphagia anorexia.      Blood pressure 140/90, pulse 64, resp. rate 16, height 1.626 m (5' 4\"), weight 71.5 kg (157 lb 9.6 oz), last menstrual period 03/25/2022, SpO2 97 %.    Calm coherent and appropriate    Neck is supple and none tender, sinuses nontender, bilateral tympanic membranes clear.  Oropharynx clear.    Breathing comfortably, clear to auscultation bilaterally    Regular rate rhythm, no murmur gallops or rubs.  Good distal pulses.  No edema.  No JVD.    Abdomen soft and nontender, normal bowel sounds.    Extremities warm well perfused with no clubbing or cyanosis    Cognition intact.      Assessment plan: Rose is a pleasant 58-year-old female here for dry persistent cough since summer, no other associated symptoms, physically active and asymptomatic, did not improve with albuterol, PPI, her exam is completely normal.  She has no other risk factors.  " Smoked socially during college but not since then.  She still has some postnasal drip and sinus symptoms, she has appointment with ENT in February, symptoms are certainly not consistent with bacterial sinus infection.  Did not really improve with Flonase.  Will get chest x-ray although I doubt this is parenchymal lung disease.  Await ENT workup.  If no findings or improvement will get PFTs and possible referral to pulmonary.  At this point I really have no answer for the cause of her cough.    Hypertension: Excellent control, continue HCTZ.    Obesity: Back on Ozempic.  She has PCOS.  She tries to eat healthy and does intermittent fasting.      Recurrent DVTs: Has had extensive negative work-up.  On aspirin.

## 2024-01-30 DIAGNOSIS — Z00.00 ENCOUNTER FOR PREVENTIVE HEALTH EXAMINATION: Primary | ICD-10-CM

## 2024-02-04 ENCOUNTER — APPOINTMENT (OUTPATIENT)
Dept: CARDIOLOGY | Facility: HOSPITAL | Age: 59
End: 2024-02-04
Payer: COMMERCIAL

## 2024-02-04 ENCOUNTER — HOSPITAL ENCOUNTER (EMERGENCY)
Facility: HOSPITAL | Age: 59
Discharge: HOME | End: 2024-02-04
Attending: EMERGENCY MEDICINE
Payer: COMMERCIAL

## 2024-02-04 VITALS
HEART RATE: 85 BPM | OXYGEN SATURATION: 96 % | DIASTOLIC BLOOD PRESSURE: 86 MMHG | BODY MASS INDEX: 26.63 KG/M2 | TEMPERATURE: 97.7 F | WEIGHT: 156 LBS | SYSTOLIC BLOOD PRESSURE: 134 MMHG | HEIGHT: 64 IN

## 2024-02-04 DIAGNOSIS — U07.1 COVID: Primary | ICD-10-CM

## 2024-02-04 LAB
ALBUMIN SERPL BCP-MCNC: 4.1 G/DL (ref 3.4–5)
ALP SERPL-CCNC: 80 U/L (ref 33–110)
ALT SERPL W P-5'-P-CCNC: 16 U/L (ref 7–45)
ANION GAP SERPL CALC-SCNC: 14 MMOL/L (ref 10–20)
APPEARANCE UR: ABNORMAL
AST SERPL W P-5'-P-CCNC: 19 U/L (ref 9–39)
BASOPHILS # BLD AUTO: 0.04 X10*3/UL (ref 0–0.1)
BASOPHILS NFR BLD AUTO: 0.8 %
BILIRUB SERPL-MCNC: 0.8 MG/DL (ref 0–1.2)
BILIRUB UR STRIP.AUTO-MCNC: NEGATIVE MG/DL
BUN SERPL-MCNC: 22 MG/DL (ref 6–23)
CALCIUM SERPL-MCNC: 9.9 MG/DL (ref 8.6–10.3)
CARDIAC TROPONIN I PNL SERPL HS: 3 NG/L (ref 0–13)
CARDIAC TROPONIN I PNL SERPL HS: 4 NG/L (ref 0–13)
CHLORIDE SERPL-SCNC: 101 MMOL/L (ref 98–107)
CO2 SERPL-SCNC: 28 MMOL/L (ref 21–32)
COLOR UR: YELLOW
CREAT SERPL-MCNC: 0.99 MG/DL (ref 0.5–1.05)
EGFRCR SERPLBLD CKD-EPI 2021: 66 ML/MIN/1.73M*2
EOSINOPHIL # BLD AUTO: 0.13 X10*3/UL (ref 0–0.7)
EOSINOPHIL NFR BLD AUTO: 2.6 %
ERYTHROCYTE [DISTWIDTH] IN BLOOD BY AUTOMATED COUNT: 12.1 % (ref 11.5–14.5)
GLUCOSE BLD MANUAL STRIP-MCNC: 131 MG/DL (ref 74–99)
GLUCOSE SERPL-MCNC: 128 MG/DL (ref 74–99)
GLUCOSE UR STRIP.AUTO-MCNC: NEGATIVE MG/DL
HCT VFR BLD AUTO: 35.5 % (ref 36–46)
HGB BLD-MCNC: 12.4 G/DL (ref 12–16)
IMM GRANULOCYTES # BLD AUTO: 0.01 X10*3/UL (ref 0–0.7)
IMM GRANULOCYTES NFR BLD AUTO: 0.2 % (ref 0–0.9)
KETONES UR STRIP.AUTO-MCNC: NEGATIVE MG/DL
LACTATE SERPL-SCNC: 1.1 MMOL/L (ref 0.4–2)
LEUKOCYTE ESTERASE UR QL STRIP.AUTO: NEGATIVE
LIPASE SERPL-CCNC: 50 U/L (ref 9–82)
LYMPHOCYTES # BLD AUTO: 1.57 X10*3/UL (ref 1.2–4.8)
LYMPHOCYTES NFR BLD AUTO: 31.2 %
MCH RBC QN AUTO: 30.5 PG (ref 26–34)
MCHC RBC AUTO-ENTMCNC: 34.9 G/DL (ref 32–36)
MCV RBC AUTO: 87 FL (ref 80–100)
MONOCYTES # BLD AUTO: 0.46 X10*3/UL (ref 0.1–1)
MONOCYTES NFR BLD AUTO: 9.1 %
NEUTROPHILS # BLD AUTO: 2.83 X10*3/UL (ref 1.2–7.7)
NEUTROPHILS NFR BLD AUTO: 56.1 %
NITRITE UR QL STRIP.AUTO: NEGATIVE
NRBC BLD-RTO: 0 /100 WBCS (ref 0–0)
PH UR STRIP.AUTO: 8 [PH]
PLATELET # BLD AUTO: 235 X10*3/UL (ref 150–450)
POTASSIUM SERPL-SCNC: 3.4 MMOL/L (ref 3.5–5.3)
PROT SERPL-MCNC: 6.4 G/DL (ref 6.4–8.2)
PROT UR STRIP.AUTO-MCNC: NEGATIVE MG/DL
RBC # BLD AUTO: 4.07 X10*6/UL (ref 4–5.2)
RBC # UR STRIP.AUTO: NEGATIVE /UL
SARS-COV-2 RNA RESP QL NAA+PROBE: DETECTED
SODIUM SERPL-SCNC: 140 MMOL/L (ref 136–145)
SP GR UR STRIP.AUTO: 1.01
TSH SERPL-ACNC: 0.61 MIU/L (ref 0.44–3.98)
UROBILINOGEN UR STRIP.AUTO-MCNC: <2 MG/DL
WBC # BLD AUTO: 5 X10*3/UL (ref 4.4–11.3)

## 2024-02-04 PROCEDURE — 83690 ASSAY OF LIPASE: CPT | Performed by: EMERGENCY MEDICINE

## 2024-02-04 PROCEDURE — 84075 ASSAY ALKALINE PHOSPHATASE: CPT | Performed by: EMERGENCY MEDICINE

## 2024-02-04 PROCEDURE — 2500000004 HC RX 250 GENERAL PHARMACY W/ HCPCS (ALT 636 FOR OP/ED): Performed by: EMERGENCY MEDICINE

## 2024-02-04 PROCEDURE — 85025 COMPLETE CBC W/AUTO DIFF WBC: CPT | Performed by: EMERGENCY MEDICINE

## 2024-02-04 PROCEDURE — 83605 ASSAY OF LACTIC ACID: CPT | Performed by: EMERGENCY MEDICINE

## 2024-02-04 PROCEDURE — 36415 COLL VENOUS BLD VENIPUNCTURE: CPT | Performed by: EMERGENCY MEDICINE

## 2024-02-04 PROCEDURE — 84443 ASSAY THYROID STIM HORMONE: CPT | Performed by: EMERGENCY MEDICINE

## 2024-02-04 PROCEDURE — 81003 URINALYSIS AUTO W/O SCOPE: CPT | Performed by: EMERGENCY MEDICINE

## 2024-02-04 PROCEDURE — 84484 ASSAY OF TROPONIN QUANT: CPT | Mod: 91 | Performed by: EMERGENCY MEDICINE

## 2024-02-04 PROCEDURE — 87635 SARS-COV-2 COVID-19 AMP PRB: CPT | Performed by: EMERGENCY MEDICINE

## 2024-02-04 PROCEDURE — 82947 ASSAY GLUCOSE BLOOD QUANT: CPT | Mod: 59

## 2024-02-04 PROCEDURE — 99283 EMERGENCY DEPT VISIT LOW MDM: CPT

## 2024-02-04 PROCEDURE — 99284 EMERGENCY DEPT VISIT MOD MDM: CPT | Mod: 25 | Performed by: EMERGENCY MEDICINE

## 2024-02-04 PROCEDURE — 93005 ELECTROCARDIOGRAM TRACING: CPT

## 2024-02-04 RX ORDER — POTASSIUM CHLORIDE 20 MEQ/1
20 TABLET, EXTENDED RELEASE ORAL DAILY
Status: DISCONTINUED | OUTPATIENT
Start: 2024-02-04 | End: 2024-02-04 | Stop reason: HOSPADM

## 2024-02-04 RX ADMIN — SODIUM CHLORIDE 500 ML: 9 INJECTION, SOLUTION INTRAVENOUS at 06:35

## 2024-02-04 RX ADMIN — POTASSIUM CHLORIDE 20 MEQ: 1500 TABLET, EXTENDED RELEASE ORAL at 08:08

## 2024-02-04 ASSESSMENT — COLUMBIA-SUICIDE SEVERITY RATING SCALE - C-SSRS
6. HAVE YOU EVER DONE ANYTHING, STARTED TO DO ANYTHING, OR PREPARED TO DO ANYTHING TO END YOUR LIFE?: NO
2. HAVE YOU ACTUALLY HAD ANY THOUGHTS OF KILLING YOURSELF?: NO
1. IN THE PAST MONTH, HAVE YOU WISHED YOU WERE DEAD OR WISHED YOU COULD GO TO SLEEP AND NOT WAKE UP?: NO

## 2024-02-04 ASSESSMENT — PAIN SCALES - GENERAL: PAINLEVEL_OUTOF10: 0 - NO PAIN

## 2024-02-04 ASSESSMENT — PAIN - FUNCTIONAL ASSESSMENT: PAIN_FUNCTIONAL_ASSESSMENT: 0-10

## 2024-02-04 NOTE — DISCHARGE INSTRUCTIONS
Please make sure to quarantine per our discussion.  Follow-up with your primary care doctor.  Return immediately if concerning symptoms, as discussed.

## 2024-02-04 NOTE — ED PROVIDER NOTES
HPI   Chief Complaint   Patient presents with    Nausea     Pt got up to use bathroom and was nauseous and shaky, no loc, no injuries, no vomiting.       HPI  Patient is a 59-year-old female with a past medical history significant for hypertension, hypokalemia, anxiety, BPPV, GERD, insomnia, panic attacks who presented to the emergency room with a chief complaint of nausea.  Patient states that she was out of town for the weekend and does not think that she ate and drink like she should.  She went to the bathroom and felt nauseous.  She felt like she was going to pass out but denies any room spinning sensation, visual field cuts, chest pain, shortness of breath, fever or pain anywhere.  She mostly feels shaky.  She took magnesium last night and had a bowel movement this morning.  Denies any other symptoms at this time including any cough, sob, chest pain, JOHNSON.  Of note, she recently restarted taking Ozempic for weight loss.      PMHx: As above  PSHx: , appendectomy  FamilyHx: Cancer  SocialHx: Denies  Allergies: ACE inhibitors  Medications: See Medication Reconciliation     ROS  As above but otherwise denies      Physical Exam    GENERAL: Awake and Alert, No Acute Distress  HEENT: AT/NC, PERRL, EOMI, Normal Oropharynx, No Signs of Dehydration  NECK: Normal Inspection, No JVD  CARDIOVASCULAR: RRR, No M/R/G  RESPIRATORY: CTA Bilaterally, No Wheezes, Rales or Rhonchi, Chest Wall Non-tender  ABDOMEN: Soft, non-tender abdomen, Normal Bowel Sounds, No Distention  BACK: No CVA Tenderness  SKIN: Normal Color, Warm, Dry, No Rashes   EXTREMITIES: Non-Tender, Full ROM, No Pedal Edema  NEURO: A&O x 3, Normal Motor and Sensation, slightly anxious mood and Affect    Nursing Assessment and Vitals Reviewed    Medical Decision  Patient seen and evaluated for nausea, feeling shaky and feeling like she was going to pass out.  On evaluation she is mildly anxious but otherwise well-appearing and in no acute distress.  She is  neurologically intact with clear lungs, heart is regular rate and rhythm, abdomen is soft, nontender nondistended.  Physical exam is very reassuring.  She is started on IV fluids that she felt like she did not eat or drink as much as she normally does while being out of town.  Denies any alcohol or drug use.  She did just recently start Ozempic.    Workup for patient included labs that revealed mild hypokalemia which was repleted orally.  Troponin was negative.  No significant leukocytosis or anemia.  No lymphopenia.  She is found to be positive for COVID.  At this time patient remains without any hypoxia, tachypnea, tachycardia or respiratory symptoms.  She is discharged in stable condition to follow-up with her primary care doctor.  She is educated on signs and symptoms that should prompt immediate return to emergency room.                            Vy Coma Scale Score: 15                  Patient History   Past Medical History:   Diagnosis Date    Metabolic syndrome     Dysmetabolic syndrome X    Personal history of other diseases of the digestive system     History of gastroesophageal reflux (GERD)    Personal history of other diseases of the musculoskeletal system and connective tissue     History of arthritis    Personal history of other venous thrombosis and embolism 2015    History of deep venous thrombosis     Past Surgical History:   Procedure Laterality Date    APPENDECTOMY  2013    Appendectomy    BLADDER SURGERY  2015    Bladder Surgery     SECTION, CLASSIC  2015     Section    OTHER SURGICAL HISTORY  2019    Dilation and curettage    OTHER SURGICAL HISTORY  2019    [place] tooth extraction     Family History   Problem Relation Name Age of Onset    Hypertension Mother      Lymphoma Mother      Hypertension Father      Uterine cancer Maternal Grandmother      Diabetes type II Maternal Grandfather      Cancer Other family history     Heart disease  Other family history     Hypertension Other family history     Other (allergic disorder) Sibling       Social History     Tobacco Use    Smoking status: Never    Smokeless tobacco: Never   Substance Use Topics    Alcohol use: Not on file     Comment: socially    Drug use: Never       Physical Exam   ED Triage Vitals [02/04/24 0555]   Temperature Heart Rate Resp BP   36.5 °C (97.7 °F) 85 -- 134/86      Pulse Ox Temp Source Heart Rate Source Patient Position   99 % Temporal Monitor Sitting      BP Location FiO2 (%)     Right arm --       Physical Exam    ED Course & MDM   Diagnoses as of 02/04/24 0902   COVID       Medical Decision Making      Procedure  Procedures     Mery Vitale MD  02/04/24 0902       Mery Vitale MD  02/04/24 0903

## 2024-02-06 LAB
ATRIAL RATE: 67 BPM
P AXIS: 67 DEGREES
P OFFSET: 216 MS
P ONSET: 165 MS
PR INTERVAL: 124 MS
Q ONSET: 227 MS
QRS COUNT: 11 BEATS
QRS DURATION: 78 MS
QT INTERVAL: 428 MS
QTC CALCULATION(BAZETT): 452 MS
QTC FREDERICIA: 444 MS
R AXIS: 34 DEGREES
T AXIS: 46 DEGREES
T OFFSET: 441 MS
VENTRICULAR RATE: 67 BPM

## 2024-02-11 ENCOUNTER — HOSPITAL ENCOUNTER (EMERGENCY)
Facility: HOSPITAL | Age: 59
Discharge: HOME | End: 2024-02-11
Attending: EMERGENCY MEDICINE
Payer: COMMERCIAL

## 2024-02-11 ENCOUNTER — APPOINTMENT (OUTPATIENT)
Dept: RADIOLOGY | Facility: HOSPITAL | Age: 59
End: 2024-02-11
Payer: COMMERCIAL

## 2024-02-11 ENCOUNTER — APPOINTMENT (OUTPATIENT)
Dept: CARDIOLOGY | Facility: HOSPITAL | Age: 59
End: 2024-02-11
Payer: COMMERCIAL

## 2024-02-11 VITALS
BODY MASS INDEX: 26.29 KG/M2 | HEIGHT: 64 IN | RESPIRATION RATE: 16 BRPM | SYSTOLIC BLOOD PRESSURE: 135 MMHG | HEART RATE: 64 BPM | WEIGHT: 154 LBS | DIASTOLIC BLOOD PRESSURE: 79 MMHG | OXYGEN SATURATION: 100 %

## 2024-02-11 DIAGNOSIS — R11.2 NAUSEA AND VOMITING, UNSPECIFIED VOMITING TYPE: Primary | ICD-10-CM

## 2024-02-11 LAB
ALBUMIN SERPL BCP-MCNC: 4.2 G/DL (ref 3.4–5)
ALP SERPL-CCNC: 78 U/L (ref 33–110)
ALT SERPL W P-5'-P-CCNC: 18 U/L (ref 7–45)
ANION GAP SERPL CALC-SCNC: 12 MMOL/L (ref 10–20)
APPEARANCE UR: CLEAR
AST SERPL W P-5'-P-CCNC: 17 U/L (ref 9–39)
BASOPHILS # BLD AUTO: 0.05 X10*3/UL (ref 0–0.1)
BASOPHILS NFR BLD AUTO: 0.7 %
BILIRUB SERPL-MCNC: 0.7 MG/DL (ref 0–1.2)
BILIRUB UR STRIP.AUTO-MCNC: NEGATIVE MG/DL
BUN SERPL-MCNC: 19 MG/DL (ref 6–23)
CALCIUM SERPL-MCNC: 9.4 MG/DL (ref 8.6–10.3)
CARDIAC TROPONIN I PNL SERPL HS: 3 NG/L (ref 0–13)
CHLORIDE SERPL-SCNC: 103 MMOL/L (ref 98–107)
CO2 SERPL-SCNC: 28 MMOL/L (ref 21–32)
COLOR UR: NORMAL
CREAT SERPL-MCNC: 1 MG/DL (ref 0.5–1.05)
EGFRCR SERPLBLD CKD-EPI 2021: 65 ML/MIN/1.73M*2
EOSINOPHIL # BLD AUTO: 0.15 X10*3/UL (ref 0–0.7)
EOSINOPHIL NFR BLD AUTO: 2 %
ERYTHROCYTE [DISTWIDTH] IN BLOOD BY AUTOMATED COUNT: 12.2 % (ref 11.5–14.5)
FLUAV RNA RESP QL NAA+PROBE: NOT DETECTED
FLUBV RNA RESP QL NAA+PROBE: NOT DETECTED
GLUCOSE SERPL-MCNC: 108 MG/DL (ref 74–99)
GLUCOSE UR STRIP.AUTO-MCNC: NEGATIVE MG/DL
HCT VFR BLD AUTO: 38.9 % (ref 36–46)
HGB BLD-MCNC: 13.3 G/DL (ref 12–16)
IMM GRANULOCYTES # BLD AUTO: 0.02 X10*3/UL (ref 0–0.7)
IMM GRANULOCYTES NFR BLD AUTO: 0.3 % (ref 0–0.9)
KETONES UR STRIP.AUTO-MCNC: NEGATIVE MG/DL
LEUKOCYTE ESTERASE UR QL STRIP.AUTO: NEGATIVE
LIPASE SERPL-CCNC: 51 U/L (ref 9–82)
LYMPHOCYTES # BLD AUTO: 2.8 X10*3/UL (ref 1.2–4.8)
LYMPHOCYTES NFR BLD AUTO: 36.7 %
MCH RBC QN AUTO: 30.9 PG (ref 26–34)
MCHC RBC AUTO-ENTMCNC: 34.2 G/DL (ref 32–36)
MCV RBC AUTO: 91 FL (ref 80–100)
MONOCYTES # BLD AUTO: 0.59 X10*3/UL (ref 0.1–1)
MONOCYTES NFR BLD AUTO: 7.7 %
NEUTROPHILS # BLD AUTO: 4.02 X10*3/UL (ref 1.2–7.7)
NEUTROPHILS NFR BLD AUTO: 52.6 %
NITRITE UR QL STRIP.AUTO: NEGATIVE
NRBC BLD-RTO: 0 /100 WBCS (ref 0–0)
PH UR STRIP.AUTO: 6 [PH]
PLATELET # BLD AUTO: 310 X10*3/UL (ref 150–450)
POTASSIUM SERPL-SCNC: 4.4 MMOL/L (ref 3.5–5.3)
PROT SERPL-MCNC: 6.6 G/DL (ref 6.4–8.2)
PROT UR STRIP.AUTO-MCNC: NEGATIVE MG/DL
RBC # BLD AUTO: 4.3 X10*6/UL (ref 4–5.2)
RBC # UR STRIP.AUTO: NEGATIVE /UL
SARS-COV-2 RNA RESP QL NAA+PROBE: NOT DETECTED
SODIUM SERPL-SCNC: 139 MMOL/L (ref 136–145)
SP GR UR STRIP.AUTO: 1
UROBILINOGEN UR STRIP.AUTO-MCNC: <2 MG/DL
WBC # BLD AUTO: 7.6 X10*3/UL (ref 4.4–11.3)

## 2024-02-11 PROCEDURE — 99283 EMERGENCY DEPT VISIT LOW MDM: CPT | Mod: 25

## 2024-02-11 PROCEDURE — 2500000004 HC RX 250 GENERAL PHARMACY W/ HCPCS (ALT 636 FOR OP/ED): Performed by: EMERGENCY MEDICINE

## 2024-02-11 PROCEDURE — 99284 EMERGENCY DEPT VISIT MOD MDM: CPT | Mod: 25 | Performed by: EMERGENCY MEDICINE

## 2024-02-11 PROCEDURE — 83690 ASSAY OF LIPASE: CPT | Performed by: EMERGENCY MEDICINE

## 2024-02-11 PROCEDURE — 71045 X-RAY EXAM CHEST 1 VIEW: CPT | Mod: FOREIGN READ | Performed by: RADIOLOGY

## 2024-02-11 PROCEDURE — 81003 URINALYSIS AUTO W/O SCOPE: CPT | Performed by: EMERGENCY MEDICINE

## 2024-02-11 PROCEDURE — 93005 ELECTROCARDIOGRAM TRACING: CPT

## 2024-02-11 PROCEDURE — 36415 COLL VENOUS BLD VENIPUNCTURE: CPT | Performed by: EMERGENCY MEDICINE

## 2024-02-11 PROCEDURE — 80053 COMPREHEN METABOLIC PANEL: CPT | Performed by: EMERGENCY MEDICINE

## 2024-02-11 PROCEDURE — 87636 SARSCOV2 & INF A&B AMP PRB: CPT | Performed by: EMERGENCY MEDICINE

## 2024-02-11 PROCEDURE — 84484 ASSAY OF TROPONIN QUANT: CPT | Performed by: EMERGENCY MEDICINE

## 2024-02-11 PROCEDURE — 85025 COMPLETE CBC W/AUTO DIFF WBC: CPT | Performed by: EMERGENCY MEDICINE

## 2024-02-11 PROCEDURE — 71045 X-RAY EXAM CHEST 1 VIEW: CPT

## 2024-02-11 RX ORDER — ONDANSETRON 4 MG/1
4 TABLET, ORALLY DISINTEGRATING ORAL EVERY 8 HOURS PRN
Qty: 15 TABLET | Refills: 0 | Status: SHIPPED | OUTPATIENT
Start: 2024-02-11

## 2024-02-11 RX ADMIN — SODIUM CHLORIDE 1000 ML: 9 INJECTION, SOLUTION INTRAVENOUS at 03:38

## 2024-02-11 ASSESSMENT — PAIN SCALES - GENERAL: PAINLEVEL_OUTOF10: 0 - NO PAIN

## 2024-02-11 ASSESSMENT — COLUMBIA-SUICIDE SEVERITY RATING SCALE - C-SSRS
1. IN THE PAST MONTH, HAVE YOU WISHED YOU WERE DEAD OR WISHED YOU COULD GO TO SLEEP AND NOT WAKE UP?: NO
2. HAVE YOU ACTUALLY HAD ANY THOUGHTS OF KILLING YOURSELF?: NO
6. HAVE YOU EVER DONE ANYTHING, STARTED TO DO ANYTHING, OR PREPARED TO DO ANYTHING TO END YOUR LIFE?: NO

## 2024-02-11 ASSESSMENT — PAIN DESCRIPTION - PROGRESSION: CLINICAL_PROGRESSION: NOT CHANGED

## 2024-02-11 ASSESSMENT — PAIN - FUNCTIONAL ASSESSMENT: PAIN_FUNCTIONAL_ASSESSMENT: 0-10

## 2024-02-11 NOTE — ED PROVIDER NOTES
HPI   Chief Complaint   Patient presents with    Shaking    Nausea    Palpitations       Patient presents with an episode of nausea vomiting today.  Of note she did not really eat dinner and had 2 glasses of wine.  She states she is only in not drink very much.  Denies any smoking or other drug use.  She is just getting over COVID and had a similar episode of shakiness and chills 1 week ago when she tested positive for COVID.  She states that her symptoms are now gone within 3 days of last .  She denies any hematemesis, diarrhea, constipation, dysuria or hematuria.  She notes that she is taking Ozempic.                          No data recorded                     Patient History   Past Medical History:   Diagnosis Date    Metabolic syndrome     Dysmetabolic syndrome X    Personal history of other diseases of the digestive system     History of gastroesophageal reflux (GERD)    Personal history of other diseases of the musculoskeletal system and connective tissue     History of arthritis    Personal history of other venous thrombosis and embolism 2015    History of deep venous thrombosis     Past Surgical History:   Procedure Laterality Date    APPENDECTOMY  2013    Appendectomy    BLADDER SURGERY  2015    Bladder Surgery     SECTION, CLASSIC  2015     Section    OTHER SURGICAL HISTORY  2019    Dilation and curettage    OTHER SURGICAL HISTORY  2019    [place] tooth extraction     Family History   Problem Relation Name Age of Onset    Hypertension Mother      Lymphoma Mother      Hypertension Father      Uterine cancer Maternal Grandmother      Diabetes type II Maternal Grandfather      Cancer Other family history     Heart disease Other family history     Hypertension Other family history     Other (allergic disorder) Sibling       Social History     Tobacco Use    Smoking status: Never    Smokeless tobacco: Never   Substance Use Topics    Alcohol use: Not on  file     Comment: socially    Drug use: Never       Physical Exam   ED Triage Vitals [02/11/24 0316]   Temp Heart Rate Respirations BP   -- 82 20 (!) 174/95      Pulse Ox Temp src Heart Rate Source Patient Position   99 % -- Monitor Sitting      BP Location FiO2 (%)     Left arm --       Physical Exam  Vitals and nursing note reviewed.   Constitutional:       General: She is not in acute distress.     Appearance: She is well-developed.   HENT:      Head: Normocephalic and atraumatic.   Eyes:      Conjunctiva/sclera: Conjunctivae normal.   Cardiovascular:      Rate and Rhythm: Normal rate and regular rhythm.      Heart sounds: No murmur heard.  Pulmonary:      Effort: Pulmonary effort is normal. No respiratory distress.      Breath sounds: Normal breath sounds.   Abdominal:      Palpations: Abdomen is soft.      Tenderness: There is no abdominal tenderness.   Musculoskeletal:         General: No swelling.      Cervical back: Neck supple.   Skin:     General: Skin is warm and dry.      Capillary Refill: Capillary refill takes less than 2 seconds.   Neurological:      Mental Status: She is alert.   Psychiatric:         Mood and Affect: Mood normal.         ED Course & MDM   Diagnoses as of 02/16/24 1326   Nausea and vomiting, unspecified vomiting type       Medical Decision Making  EKG interpreted by myself.  Normal sinus rhythm at a rate of 69 bpm.  Normal intervals.  Normal axis.  No signs of acute ischemia.      Patient has benign abdomen.  Do consider Ozempic as a possible cause of stomach irritation but she is in the absence of pain feel pancreatitis is less likely.  Considered MI.  Patient has unremarkable EKG.  This only may be irritation from alcohol.  Suggest no alcohol if this is the cause of irritation.    Procedure  Procedures     Jose Alfredo Narvaez MD  02/16/24 1326

## 2024-02-13 ENCOUNTER — TELEPHONE (OUTPATIENT)
Dept: OBSTETRICS AND GYNECOLOGY | Facility: CLINIC | Age: 59
End: 2024-02-13

## 2024-02-13 ENCOUNTER — OFFICE VISIT (OUTPATIENT)
Dept: PRIMARY CARE | Facility: CLINIC | Age: 59
End: 2024-02-13
Payer: COMMERCIAL

## 2024-02-13 VITALS
OXYGEN SATURATION: 98 % | BODY MASS INDEX: 26.38 KG/M2 | WEIGHT: 154.54 LBS | SYSTOLIC BLOOD PRESSURE: 148 MMHG | HEART RATE: 87 BPM | DIASTOLIC BLOOD PRESSURE: 94 MMHG | HEIGHT: 64 IN | RESPIRATION RATE: 16 BRPM

## 2024-02-13 DIAGNOSIS — F41.9 ANXIETY: Primary | ICD-10-CM

## 2024-02-13 PROCEDURE — 3080F DIAST BP >= 90 MM HG: CPT | Performed by: INTERNAL MEDICINE

## 2024-02-13 PROCEDURE — 3077F SYST BP >= 140 MM HG: CPT | Performed by: INTERNAL MEDICINE

## 2024-02-13 PROCEDURE — 99214 OFFICE O/P EST MOD 30 MIN: CPT | Performed by: INTERNAL MEDICINE

## 2024-02-13 PROCEDURE — 1036F TOBACCO NON-USER: CPT | Performed by: INTERNAL MEDICINE

## 2024-02-13 RX ORDER — BUSPIRONE HYDROCHLORIDE 7.5 MG/1
7.5 TABLET ORAL 2 TIMES DAILY
Qty: 60 TABLET | Refills: 0 | Status: SHIPPED | OUTPATIENT
Start: 2024-02-13 | End: 2024-03-04 | Stop reason: SDUPTHER

## 2024-02-13 NOTE — PROGRESS NOTES
"Office  Visit/ Bp Check       Subjective   Patient ID: Edna Dawson is a 59 y.o. female who presents for No chief complaint on file..  HPI  58 yo female with HTN and obesity here after two episodes of heart racing with high Bps.   She states that she woke up with shaking, heart racig on 2/4, was found to be COVID positive. She went again on 2/11 at which time she was nauseous, heart racing, and she was shaking. Starting at midnight, she woke up, says she felt similarly. Blood pressures were ranging 130-170 systolics this morning. Highest was 172/101. She is currently taking hydrochlorothiazide and KCL, Ozempic for weight loss (stopped it on 11/19 and restarted it four weeks ago). Checks her blood pressure when she doesn't feel well mainly.      No fevers or chills, no sick contacts. ROS positive for some anxiety as dad is getting moved to an old age home. She said she has been on Atarax in the past as needed, but stopped it 2/2 interaction with oxybutynin. She has tried Lexapro but had bad side effects so stopped it. She is currently anxious about having a similar episode at night.     Review of Systems  No fevers no chills, no weight changes.    No URI symptoms    No cough no shortness of breath    No chest pain no palpitations    Appetite intact, no abdominal pain.    No new or unusual headaches.      Objective   Physical Exam  Blood pressure (!) 152/100, pulse 87, resp. rate 16, height 1.626 m (5' 4\"), weight 70.1 kg (154 lb 8.7 oz), last menstrual period 03/25/2022, SpO2 98 %.  Repeat blood pressure is 140/94.     initially she was very anxious, by end of exam she was much calmer.  Coherent, appropriate.      Neck is supple and none tender    Breathing comfortably, clear to auscultation bilaterally    Regular rate rhythm, no murmur gallops or rubs.  Good distal pulses.  No edema.  No JVD.    Abdomen soft and nontender, normal bowel sounds.    Extremities warm well perfused with no clubbing or " cyanosis    Cognition intact.    Assessment/Plan     58 yo female with HTN and obesity here with symptoms that appear consistent with panic attacks. Patient has had two full workups at ED including EKG, KUB, and BMP which were negative. BP high in office today, but patient says she is anxious, HR wnl in office. Given that episodes happen at night each time, appears consistent with panic attack.  Has not tolerated SSRIs in the past.  Will start on Buspar, encouraged to continue therapy.     #Panic attacks  - Buspar prescribed  - Went over lifestyle changes including exercise and meditation  - Continue with therapy  - If BP remains elevated afterwards, pt to call and we will start her on a beta-blocker.  Pt seen and discussed with Dr. Arellano.        Riya Lucio MD 02/13/24 12:46 PM       Attending note:    I reviewed resident's note including history, exam, assessment and plan.  I personally obtained key parts of history and examined the patient,  I agree with resident's assessment and plan as stated above.       Pleasant 59-year-old female with known history of anxiety and panic attacks, she is here for follow-up of emergency room visit for what appeared to be 2 panic attacks both happening in the middle of the night, in between episodes she is completely symptomatic and back to her normal life.  She exercises without any symptoms.  Currently denies any chest pain shortness of breath headache blurry vision.  No dizziness lightheadedness.  Has been going through some stressors at home and personal life with her aging parents and children.  I reviewed her emergency room note labs EKG reports.  Today on exam initially hypertensive, improved by end of exam, she is coherent, cranial nerves II through XII intact, alert oriented appropriate.  Regular rate and rhythm, no murmur gallops or rubs, no edema no JVD.  Had a long discussion with her, before escalating her blood pressure regimen I think we need to treat her  anxiety which is most likely the main  of her symptoms.  She has tried SSRI in the past and did not tolerate them.  Start BuSpar 7.5 mg twice a day, call with any side effects.  We also discussed importance of aerobic exercise for at least 40 minutes a day, daily meditation.  She will implement these changes, continue monitoring blood pressures at home.  Call if systolics persistently above 140 diastolics above 80.  Otherwise follow-up in 1 month.

## 2024-02-13 NOTE — TELEPHONE ENCOUNTER
Pt verified by name and .  Pt calling stats she was using vaginal estrogen cream did not use it regularly questions if she is having a reaction.  Pt left my chart message for Rad Echols.  Pt is aware message is on Rad's desk.  Pt states she took her bp this morning is was 177/104, 137/96 and 159-93.  Pt states her bp's last night were 172/101, 152/94,147/103 and 160/97.  Pt is aware Rad Garcia is going to send her a message back.  Pt is aware she needs to go to ER or make appt taoday with pcp.  Nurse called pt back she stated she has appt today at 12:40 pm with pcp.

## 2024-02-14 ENCOUNTER — OFFICE VISIT (OUTPATIENT)
Dept: OTOLARYNGOLOGY | Facility: CLINIC | Age: 59
End: 2024-02-14
Payer: COMMERCIAL

## 2024-02-14 ENCOUNTER — DOCUMENTATION (OUTPATIENT)
Dept: OBSTETRICS AND GYNECOLOGY | Facility: CLINIC | Age: 59
End: 2024-02-14

## 2024-02-14 VITALS — BODY MASS INDEX: 26.73 KG/M2 | HEIGHT: 64 IN | WEIGHT: 156.6 LBS

## 2024-02-14 DIAGNOSIS — R05.8 OTHER COUGH: ICD-10-CM

## 2024-02-14 DIAGNOSIS — R04.0 EPISTAXIS: ICD-10-CM

## 2024-02-14 DIAGNOSIS — J31.0 CHRONIC RHINITIS: Primary | ICD-10-CM

## 2024-02-14 DIAGNOSIS — R09.81 NASAL CONGESTION: ICD-10-CM

## 2024-02-14 PROCEDURE — 1036F TOBACCO NON-USER: CPT | Performed by: OTOLARYNGOLOGY

## 2024-02-14 PROCEDURE — 99214 OFFICE O/P EST MOD 30 MIN: CPT | Performed by: OTOLARYNGOLOGY

## 2024-02-14 PROCEDURE — 31231 NASAL ENDOSCOPY DX: CPT | Performed by: OTOLARYNGOLOGY

## 2024-02-14 RX ORDER — MUPIROCIN 20 MG/G
OINTMENT TOPICAL
Qty: 15 G | Refills: 2 | Status: SHIPPED | OUTPATIENT
Start: 2024-02-14 | End: 2024-03-15

## 2024-02-14 RX ORDER — MUPIROCIN 20 MG/G
OINTMENT TOPICAL ONCE
Status: CANCELLED | OUTPATIENT
Start: 2024-02-14 | End: 2024-02-14

## 2024-02-14 ASSESSMENT — PATIENT HEALTH QUESTIONNAIRE - PHQ9
SUM OF ALL RESPONSES TO PHQ9 QUESTIONS 1 & 2: 0
1. LITTLE INTEREST OR PLEASURE IN DOING THINGS: NOT AT ALL
2. FEELING DOWN, DEPRESSED OR HOPELESS: NOT AT ALL

## 2024-02-14 ASSESSMENT — PAIN SCALES - GENERAL: PAINLEVEL: 0-NO PAIN

## 2024-02-14 NOTE — PROGRESS NOTES
Chief Complaint:  1. Postnasal drainage  2. Nasal airway obstruction  3. Throat clearing, coughing  4. Chronic rhinitis / epistaxis  5. Deviated nasal septum  6. Altered taste  7. Symptoms consistent with left tympanic myoclonus  8. Anxiety    History Of Present Illness:    Main Symptoms:  Patient does not have anterior nasal drainage.     Patient has  posterior nasal drainage.  intermittent   Patient has nasal airway obstruction. from scabbing  Patient does not have  facial pain.    Patient does not have  facial pressure.    Patient does not have decreased sense of smell.   Associated Symptoms:   Patient does not have  headaches.    Patient has throat clearing.    Patient has coughing.  Sporadic.  Seems to be improving.   Patient does not have dysphonia.   Patient has nasal bleeding. Left > right     Edna Dawson presents since last being seen 7/28/21.     She is continue to have dryness of her nasal cavities worse on the left-hand side.  She gets crusting and scabbing that she removes with her finger.  She can get left-sided bleeding in particular when she manipulates this region.  She uses saline gel in it to perform at night and Flonase as needed.  She was using Claritin but is discontinued this therapy she is also used omeprazole in the past.     Active Problems:  Patient Active Problem List   Diagnosis    Anxiety    Atypical chest pain    Back pain, chronic    Backache    BPPV (benign paroxysmal positional vertigo)    Chronic ethmoidal sinusitis    Chronic rhinitis    Closed fracture of finger, phalanx    Compression of vein    Depression    Deviated nasal septum    Disease due to severe acute respiratory syndrome coronavirus 2 (SARS-CoV-2)    DVT (deep venous thrombosis) (CMS/HCC)    Dyslipidemia    Dysmetabolic syndrome X    Edema    Esophageal reflux    Finger injury    Heart palpitations    Hemangioma of skin and subcutaneous tissue    Hemorrhoids    Hirsutism    Hypertension    Hyperthyroidism    IGT  (impaired glucose tolerance)    Insomnia    Joint pain, knee    Left breast mass    Lichen sclerosus    Lumbar degenerative disc disease    Melanocytic nevi of trunk    Menopausal state    Muscle spasm disorder of tensor tympani of left ear    Neuropathic pain    Obesity    Other melanin hyperpigmentation    Other seborrheic keratosis    Pain of lower extremity    Panic attacks    Pendulous breast    Polycystic ovarian syndrome    Postnasal drip    Right hip pain    Rotator cuff tendonitis    Sciatica of left side    Sebaceous cyst    Shoulder pain    Sprain and strain of interphalangeal (joint) of hand    Tendinopathy of right gluteus medius    Tinea unguium    Menopausal symptom    UTI symptoms    Condition not found    Melanocytic nevi of lower limb, including hip    Melanocytic nevi of unspecified upper limb, including shoulder    Abnormal mammogram    Hot flashes due to menopause    Left shoulder pain    Lumbar radiculopathy, right    Recurrent major depressive disorder in remission (CMS/HCC)    Right shoulder pain    Subclinical hyperthyroidism    Vaginal atrophy    Vulvar mass    COVID      Past Medical History:  She has a past medical history of Metabolic syndrome, Personal history of other diseases of the digestive system, Personal history of other diseases of the musculoskeletal system and connective tissue, and Personal history of other venous thrombosis and embolism (2015).    Surgical History:  She has a past surgical history that includes Appendectomy (2013); Other surgical history (2019); Other surgical history (2019);  section, classic (2015); and Bladder surgery (2015).     Family History:  Family History   Problem Relation Name Age of Onset    Hypertension Mother      Lymphoma Mother      Hypertension Father      Uterine cancer Maternal Grandmother      Diabetes type II Maternal Grandfather      Cancer Other family history     Heart disease Other family  history     Hypertension Other family history     Other (allergic disorder) Sibling       Social History:  She reports that she has never smoked. She has never used smokeless tobacco. She reports that she does not use drugs. No history on file for alcohol use.     Allergies:  Ace inhibitors    Current Meds:  Current Outpatient Medications:     aspirin 81 mg EC tablet, Take by mouth., Disp: , Rfl:     atorvastatin (Lipitor) 10 mg tablet, Take 1 tablet (10 mg) by mouth once daily at bedtime., Disp: , Rfl:     busPIRone (Buspar) 7.5 mg tablet, Take 1 tablet (7.5 mg) by mouth 2 times a day., Disp: 60 tablet, Rfl: 0    clobetasol (Temovate) 0.05 % cream, APPLY SPARINGLY TO vulva 3 TIMES A WEEK, Disp: , Rfl:     esomeprazole (NexIUM) 40 mg DR capsule, Take 1 capsule (40 mg) by mouth once daily., Disp: 90 capsule, Rfl: 0    estradiol (Estrace) 0.01 % (0.1 mg/gram) vaginal cream, Apply pea sized amount  to vaginal opening every Monday, Wednesday and Friday evening, Disp: 42.5 g, Rfl: 2    fluticasone (Flonase) 50 mcg/actuation nasal spray, Administer 2 sprays into each nostril once daily as needed., Disp: , Rfl:     hydroCHLOROthiazide (HYDRODiuril) 25 mg tablet, Take 1 tablet (25 mg) by mouth once daily., Disp: , Rfl:     hydrocortisone (Proctozone-HC) 2.5 % rectal cream, apply to affected area bid prn, Disp: , Rfl:     multivitamin tablet, Take 1 tablet by mouth once daily., Disp: , Rfl:     ondansetron ODT (Zofran-ODT) 4 mg disintegrating tablet, Take 1 tablet (4 mg) by mouth every 8 hours if needed for nausea or vomiting., Disp: 15 tablet, Rfl: 0    oxybutynin (Ditropan) 5 mg tablet, , Disp: , Rfl:     Ozempic 1 mg/dose (4 mg/3 mL) pen injector, , Disp: , Rfl:     potassium chloride CR 10 mEq ER tablet, Take 1 tablet (10 mEq) by mouth once daily., Disp: , Rfl:     Vitals:  Visit Vitals  [facility] 03/25/2022   OB Status Postmenopausal   Smoking Status Never      Physical Exam:  Nose: On external exam there are neither lesions  nor asymmetry of the nasal tip/dorsum. On anterior rhinoscopy, visualization posteriorly is limited on anterior examination. For this reason, to adequately evaluate posteriorly for masses, source of epistaxis, polypoid disease, debridement, and/or signs of infections, nasal endoscopy is indicated.  (Please see procedure below.)    SINONASAL ENDOSCOPY (CPT 27572): To better evaluate the patient's symptoms, sinonasal endoscopy is indicated.  After discussion of risks and benefits, and topical decongestion and anesthesia,an endoscope was used to perform nasal endoscopy on each side.  A time out identifying the patient, the procedure, the location of the procedure and any concerns was performed prior to beginning the procedure.    Findings: Examination of each nasal cavity revealed anterior septal dryness.  This was more significant on the left and associated with some digital trauma.  Each middle meatus and sphenoethmoid recess were normal.    The scope was further advanced to view her pharynx and larynx and the structures were normal.    Results/Data:  I personally reviewed a CT head from September 3, 2022.  There were mild inflammatory changes within her sinuses.    Provider Impressions:  1.  Postnasal drainage  2.  Nasal airway obstruction secondary to crusting; chronic rhinitis  3.  Epistaxis  4.  Throat clearing, coughing    Discussion:  Edna Dawson and I discussed next steps.  I recommended that she initiate Bactroban 3 times daily for the next several months.  In situations such as hers, it can take a prolonged period of time to resolve the irritation because it has been longstanding.  We discussed several strategies to get it within her nose including applying it with her finger or using a Q-tip.  I again emphasized not manipulating her anterior septum with her finger to remove crusts.    She also mentioned some issues with throat clearing and coughing.  I do not see anything specifically concerning on  examination today but I describe what is involved in assessment with one of my voice partners and the elevated sophistication of their testing.  Contact information was provided to that division in the event that her cough persists.    I asked her to follow-up with me in about 3 months.  I am hopeful that with the Bactroban as well as the weather improving her symptoms will minimize.  If not, we discussed placement of Clemens splints in the operating room setting as a way to cover the areas of irritation and facilitate healing.    All questions were answered.    Between face-to-face contact, review of the medical record, and documentation I spent greater than 30 minutes on this visit today.    Signature:  Scribe Attestation  By signing my name below, I, Cassi Hernandez   attest that this documentation has been prepared under the direction and in the presence of Stu Simental MD.

## 2024-03-04 DIAGNOSIS — Z78.0 MENOPAUSE: Primary | ICD-10-CM

## 2024-03-04 DIAGNOSIS — E78.5 DYSLIPIDEMIA: ICD-10-CM

## 2024-03-04 DIAGNOSIS — F41.9 ANXIETY: ICD-10-CM

## 2024-03-04 RX ORDER — OXYBUTYNIN CHLORIDE 5 MG/1
5 TABLET ORAL 2 TIMES DAILY
Qty: 90 TABLET | Refills: 2 | Status: SHIPPED | OUTPATIENT
Start: 2024-03-04 | End: 2024-04-03

## 2024-03-04 RX ORDER — BUSPIRONE HYDROCHLORIDE 7.5 MG/1
7.5 TABLET ORAL 2 TIMES DAILY
Qty: 180 TABLET | Refills: 1 | Status: SHIPPED | OUTPATIENT
Start: 2024-03-04 | End: 2024-03-06 | Stop reason: ALTCHOICE

## 2024-03-04 RX ORDER — ATORVASTATIN CALCIUM 10 MG/1
10 TABLET, FILM COATED ORAL NIGHTLY
Qty: 90 TABLET | Refills: 1 | Status: SHIPPED | OUTPATIENT
Start: 2024-03-04 | End: 2024-08-31

## 2024-03-06 DIAGNOSIS — F41.9 ANXIETY: Primary | ICD-10-CM

## 2024-03-06 RX ORDER — BUSPIRONE HYDROCHLORIDE 15 MG/1
15 TABLET ORAL 2 TIMES DAILY
Qty: 120 TABLET | Refills: 1 | Status: SHIPPED | OUTPATIENT
Start: 2024-03-06 | End: 2024-05-05

## 2024-03-27 ENCOUNTER — OFFICE VISIT (OUTPATIENT)
Dept: ORTHOPEDIC SURGERY | Facility: HOSPITAL | Age: 59
End: 2024-03-27
Payer: COMMERCIAL

## 2024-03-27 ENCOUNTER — HOSPITAL ENCOUNTER (OUTPATIENT)
Dept: RADIOLOGY | Facility: HOSPITAL | Age: 59
Discharge: HOME | End: 2024-03-27
Payer: COMMERCIAL

## 2024-03-27 DIAGNOSIS — M25.552 LEFT HIP PAIN: ICD-10-CM

## 2024-03-27 PROCEDURE — 99214 OFFICE O/P EST MOD 30 MIN: CPT | Performed by: ORTHOPAEDIC SURGERY

## 2024-03-27 PROCEDURE — 1036F TOBACCO NON-USER: CPT | Performed by: ORTHOPAEDIC SURGERY

## 2024-03-27 PROCEDURE — 73502 X-RAY EXAM HIP UNI 2-3 VIEWS: CPT | Mod: LEFT SIDE | Performed by: RADIOLOGY

## 2024-03-27 PROCEDURE — 73502 X-RAY EXAM HIP UNI 2-3 VIEWS: CPT | Mod: LT

## 2024-03-27 ASSESSMENT — PAIN - FUNCTIONAL ASSESSMENT: PAIN_FUNCTIONAL_ASSESSMENT: NO/DENIES PAIN

## 2024-03-27 NOTE — PROGRESS NOTES
HPI  This is a pleasant 59 y.o. female here today for left hip pain.    Complaining of 2 weeks of left lateral hip pain.  Started while they are on vacation to [place] [facility].  States that she was sleeping exclusively on her left side during the vacation because she always brings in a sleeping  to sleep and because she does not trust hotel sheets.  Towards the end of the vacation, started complaining of lateral left hip pain.  She has previously seen Dr. Seth for right gluteus medius tendinopathy, and underwent injection for this in  with great relief.  She states that this pain feels the same as the right side did previously.  She is interested in seeing Dr. Seth for ultrasound-guided corticosteroid injection.  She has been doing PT exercises at home.  She is also been taking ibuprofen intermittently.  They are going to [place] in early May, and would like to maximize therapy so she is able to hike without pain.    Past Medical History:   Diagnosis Date    Metabolic syndrome     Dysmetabolic syndrome X    Personal history of other diseases of the digestive system     History of gastroesophageal reflux (GERD)    Personal history of other diseases of the musculoskeletal system and connective tissue     History of arthritis    Personal history of other venous thrombosis and embolism 2015    History of deep venous thrombosis       Past Surgical History:   Procedure Laterality Date    APPENDECTOMY  2013    Appendectomy    BLADDER SURGERY  2015    Bladder Surgery     SECTION, CLASSIC  2015     Section    OTHER SURGICAL HISTORY  2019    Dilation and curettage    OTHER SURGICAL HISTORY  2019    [place] tooth extraction       Social History     Socioeconomic History    Marital status:      Spouse name: Not on file    Number of children: Not on file    Years of education: Not on file    Highest education level: Not on file   Occupational History    Not on  file   Tobacco Use    Smoking status: Never    Smokeless tobacco: Never   Substance and Sexual Activity    Alcohol use: Not on file     Comment: socially    Drug use: Never    Sexual activity: Not on file   Other Topics Concern    Not on file   Social History Narrative    Not on file     Social Determinants of Health     Financial Resource Strain: Not on file   Food Insecurity: Not on file   Transportation Needs: Not on file   Physical Activity: Not on file   Stress: Not on file   Social Connections: Not on file   Intimate Partner Violence: Not on file   Housing Stability: Not on file         ROS  Review of systems reviewed and pertinent positives mentioned in HPI.      PHYSICAL EXAM  There is not  pain with a resisted situp.    There is not abdominal distention or tenderness    The patient's range of motion reveals that they have 90° of hip flexion on the affected side.   Hip extension to 10°, internal rotation 10, external rotation 40  Abduction to 45°      The patient is 5 out of 5 strength with resisted hip AB, adduction, hamstring and quadriceps testing.  Pain with resisted hip abduction.    No pain over the hip flexor, ASIS.  No pain over the proximal hamstring, piriformis      Pain Provacation testing:    Negative Psoas impingement/Gisela test  Negative instability, Log roll.    Negative straight leg raise.  Negative circumduction clunk.    Peritrochanteric space examination:  Tenderness over the armaan-trochanteric space - Yes  pain over the posterior trochanter - Yes      IMAGING  X-rays reviewed reveal no gross fracture or dislocation. and mild degenerative change of the hip.  Calcification over the greater trochanter likely sequela of chronic tendinopathy versus chronic bursitis    ASSESSMENT  Left gluteus medius tendinopathy versus greater trochanteric bursitis    PLAN  This is a 59 y.o. female patient here today with significant hip pain.  We will have her follow-up with Dr. Seth towards the end of  April for possible corticosteroid injection.  She was encouraged to continue with anti-inflammatories and home exercise program.  She will follow-up with me as needed.

## 2024-04-01 ENCOUNTER — HOSPITAL ENCOUNTER (EMERGENCY)
Facility: HOSPITAL | Age: 59
Discharge: HOME | End: 2024-04-01
Attending: EMERGENCY MEDICINE
Payer: COMMERCIAL

## 2024-04-01 VITALS
WEIGHT: 155 LBS | SYSTOLIC BLOOD PRESSURE: 153 MMHG | HEIGHT: 64 IN | OXYGEN SATURATION: 98 % | DIASTOLIC BLOOD PRESSURE: 69 MMHG | TEMPERATURE: 98.4 F | RESPIRATION RATE: 18 BRPM | BODY MASS INDEX: 26.46 KG/M2 | HEART RATE: 68 BPM

## 2024-04-01 DIAGNOSIS — F41.9 ANXIETY: Primary | ICD-10-CM

## 2024-04-01 LAB
ALBUMIN SERPL BCP-MCNC: 4.4 G/DL (ref 3.4–5)
ALP SERPL-CCNC: 72 U/L (ref 33–110)
ALT SERPL W P-5'-P-CCNC: 30 U/L (ref 7–45)
ANION GAP SERPL CALC-SCNC: 13 MMOL/L (ref 10–20)
APPEARANCE UR: CLEAR
AST SERPL W P-5'-P-CCNC: 35 U/L (ref 9–39)
ATRIAL RATE: 69 BPM
BASOPHILS # BLD AUTO: 0.04 X10*3/UL (ref 0–0.1)
BASOPHILS NFR BLD AUTO: 0.6 %
BILIRUB SERPL-MCNC: 0.7 MG/DL (ref 0–1.2)
BILIRUB UR STRIP.AUTO-MCNC: NEGATIVE MG/DL
BUN SERPL-MCNC: 21 MG/DL (ref 6–23)
CALCIUM SERPL-MCNC: 9.2 MG/DL (ref 8.6–10.3)
CARDIAC TROPONIN I PNL SERPL HS: 3 NG/L (ref 0–13)
CHLORIDE SERPL-SCNC: 102 MMOL/L (ref 98–107)
CO2 SERPL-SCNC: 27 MMOL/L (ref 21–32)
COLOR UR: NORMAL
CREAT SERPL-MCNC: 1.05 MG/DL (ref 0.5–1.05)
EGFRCR SERPLBLD CKD-EPI 2021: 61 ML/MIN/1.73M*2
EOSINOPHIL # BLD AUTO: 0.12 X10*3/UL (ref 0–0.7)
EOSINOPHIL NFR BLD AUTO: 1.9 %
ERYTHROCYTE [DISTWIDTH] IN BLOOD BY AUTOMATED COUNT: 12.7 % (ref 11.5–14.5)
FLUAV RNA RESP QL NAA+PROBE: NOT DETECTED
FLUBV RNA RESP QL NAA+PROBE: NOT DETECTED
GLUCOSE SERPL-MCNC: 113 MG/DL (ref 74–99)
GLUCOSE UR STRIP.AUTO-MCNC: NORMAL MG/DL
HCT VFR BLD AUTO: 35.2 % (ref 36–46)
HGB BLD-MCNC: 12.1 G/DL (ref 12–16)
HOLD SPECIMEN: NORMAL
IMM GRANULOCYTES # BLD AUTO: 0.02 X10*3/UL (ref 0–0.7)
IMM GRANULOCYTES NFR BLD AUTO: 0.3 % (ref 0–0.9)
KETONES UR STRIP.AUTO-MCNC: NEGATIVE MG/DL
LEUKOCYTE ESTERASE UR QL STRIP.AUTO: NEGATIVE
LYMPHOCYTES # BLD AUTO: 2.04 X10*3/UL (ref 1.2–4.8)
LYMPHOCYTES NFR BLD AUTO: 31.7 %
MAGNESIUM SERPL-MCNC: 2.4 MG/DL (ref 1.6–2.4)
MCH RBC QN AUTO: 31 PG (ref 26–34)
MCHC RBC AUTO-ENTMCNC: 34.4 G/DL (ref 32–36)
MCV RBC AUTO: 90 FL (ref 80–100)
MONOCYTES # BLD AUTO: 0.49 X10*3/UL (ref 0.1–1)
MONOCYTES NFR BLD AUTO: 7.6 %
NEUTROPHILS # BLD AUTO: 3.72 X10*3/UL (ref 1.2–7.7)
NEUTROPHILS NFR BLD AUTO: 57.9 %
NITRITE UR QL STRIP.AUTO: NEGATIVE
NRBC BLD-RTO: 0 /100 WBCS (ref 0–0)
P AXIS: 58 DEGREES
P OFFSET: 217 MS
P ONSET: 166 MS
PH UR STRIP.AUTO: 6.5 [PH]
PLATELET # BLD AUTO: 270 X10*3/UL (ref 150–450)
POTASSIUM SERPL-SCNC: 4.6 MMOL/L (ref 3.5–5.3)
PR INTERVAL: 126 MS
PROT SERPL-MCNC: 6.9 G/DL (ref 6.4–8.2)
PROT UR STRIP.AUTO-MCNC: NEGATIVE MG/DL
Q ONSET: 229 MS
QRS COUNT: 11 BEATS
QRS DURATION: 86 MS
QT INTERVAL: 430 MS
QTC CALCULATION(BAZETT): 460 MS
QTC FREDERICIA: 450 MS
R AXIS: 34 DEGREES
RBC # BLD AUTO: 3.9 X10*6/UL (ref 4–5.2)
RBC # UR STRIP.AUTO: NEGATIVE /UL
SARS-COV-2 RNA RESP QL NAA+PROBE: NOT DETECTED
SODIUM SERPL-SCNC: 137 MMOL/L (ref 136–145)
SP GR UR STRIP.AUTO: 1.01
T AXIS: 37 DEGREES
T OFFSET: 444 MS
TSH SERPL-ACNC: 2.29 MIU/L (ref 0.44–3.98)
UROBILINOGEN UR STRIP.AUTO-MCNC: NORMAL MG/DL
VENTRICULAR RATE: 69 BPM
WBC # BLD AUTO: 6.4 X10*3/UL (ref 4.4–11.3)

## 2024-04-01 PROCEDURE — 83735 ASSAY OF MAGNESIUM: CPT

## 2024-04-01 PROCEDURE — 87636 SARSCOV2 & INF A&B AMP PRB: CPT | Performed by: EMERGENCY MEDICINE

## 2024-04-01 PROCEDURE — 81003 URINALYSIS AUTO W/O SCOPE: CPT | Performed by: EMERGENCY MEDICINE

## 2024-04-01 PROCEDURE — 80053 COMPREHEN METABOLIC PANEL: CPT | Performed by: EMERGENCY MEDICINE

## 2024-04-01 PROCEDURE — 85025 COMPLETE CBC W/AUTO DIFF WBC: CPT | Performed by: EMERGENCY MEDICINE

## 2024-04-01 PROCEDURE — 2500000001 HC RX 250 WO HCPCS SELF ADMINISTERED DRUGS (ALT 637 FOR MEDICARE OP)

## 2024-04-01 PROCEDURE — 84443 ASSAY THYROID STIM HORMONE: CPT

## 2024-04-01 PROCEDURE — 84484 ASSAY OF TROPONIN QUANT: CPT | Performed by: EMERGENCY MEDICINE

## 2024-04-01 PROCEDURE — 99283 EMERGENCY DEPT VISIT LOW MDM: CPT

## 2024-04-01 PROCEDURE — 36415 COLL VENOUS BLD VENIPUNCTURE: CPT | Performed by: EMERGENCY MEDICINE

## 2024-04-01 RX ORDER — LORAZEPAM 1 MG/1
1 TABLET ORAL ONCE
Status: COMPLETED | OUTPATIENT
Start: 2024-04-01 | End: 2024-04-01

## 2024-04-01 RX ORDER — ACETAMINOPHEN 325 MG/1
650 TABLET ORAL ONCE
Status: COMPLETED | OUTPATIENT
Start: 2024-04-01 | End: 2024-04-01

## 2024-04-01 RX ADMIN — ACETAMINOPHEN 650 MG: 325 TABLET ORAL at 05:34

## 2024-04-01 RX ADMIN — LORAZEPAM 1 MG: 1 TABLET ORAL at 05:34

## 2024-04-01 ASSESSMENT — PAIN SCALES - GENERAL
PAINLEVEL_OUTOF10: 0 - NO PAIN

## 2024-04-01 ASSESSMENT — PAIN DESCRIPTION - PROGRESSION
CLINICAL_PROGRESSION: GRADUALLY IMPROVING
CLINICAL_PROGRESSION: RESOLVED

## 2024-04-01 ASSESSMENT — PAIN - FUNCTIONAL ASSESSMENT
PAIN_FUNCTIONAL_ASSESSMENT: 0-10
PAIN_FUNCTIONAL_ASSESSMENT: 0-10

## 2024-04-01 NOTE — ED PROVIDER NOTES
HPI   Chief Complaint   Patient presents with    Anxiety       HPI  HISTORY OF PRESENT ILLNESS:  59 y.o. female presenting to the ED with complaint of a possible anxiety attack.  Patient states that she woke up from sleep with tingling in all 4 extremities, heart racing, nausea, and feeling like she was going to die.  They called 911, EMS evaluated her at the scene, her vitals were normal, and they declined EMS transport, EMS told him to go to the ED if her symptoms returned or worsened.  Her symptoms have steadily improved, but she decided to come in to get checked out.  She states that she has had similar symptoms in the past and has been worked up and no etiology has been found.  Has had panic attacks as well in the past, but this seems worse than prior episodes.  She currently states that she just feels 'off.' She reports a mild generalized aching headache.  The tingling, nausea, and palpitations have resolved.  She has had no chest pain or shortness of breath.  No syncopal episodes.  No vomiting, no diarrhea, no abdominal pain.  No blurry vision.  No neck pain or stiffness.  States that she was in her usual state of health yesterday and had no symptoms prior to going to bed.  No other complaints or symptoms voiced.    12 point review of systems was performed and is negative unless otherwise specified in HPI.    PMH: anxiety, HLD  Family history: noncontributory  Social history: non smoker, no ETOH, no illicit substances  Past Medical History:   Diagnosis Date    Metabolic syndrome     Dysmetabolic syndrome X    Personal history of other diseases of the digestive system     History of gastroesophageal reflux (GERD)    Personal history of other diseases of the musculoskeletal system and connective tissue     History of arthritis    Personal history of other venous thrombosis and embolism 12/22/2015    History of deep venous thrombosis         Abnormal Labs Reviewed   CBC WITH AUTO DIFFERENTIAL - Abnormal; Notable  for the following components:       Result Value    RBC 3.90 (*)     Hematocrit 35.2 (*)     All other components within normal limits      No orders to display               No data recorded                   Patient History   Past Medical History:   Diagnosis Date    Metabolic syndrome     Dysmetabolic syndrome X    Personal history of other diseases of the digestive system     History of gastroesophageal reflux (GERD)    Personal history of other diseases of the musculoskeletal system and connective tissue     History of arthritis    Personal history of other venous thrombosis and embolism 2015    History of deep venous thrombosis     Past Surgical History:   Procedure Laterality Date    APPENDECTOMY  2013    Appendectomy    BLADDER SURGERY  2015    Bladder Surgery     SECTION, CLASSIC  2015     Section    OTHER SURGICAL HISTORY  2019    Dilation and curettage    OTHER SURGICAL HISTORY  2019    [place] tooth extraction     Family History   Problem Relation Name Age of Onset    Hypertension Mother      Lymphoma Mother      Hypertension Father      Uterine cancer Maternal Grandmother      Diabetes type II Maternal Grandfather      Cancer Other family history     Heart disease Other family history     Hypertension Other family history     Other (allergic disorder) Sibling       Social History     Tobacco Use    Smoking status: Never    Smokeless tobacco: Never   Substance Use Topics    Alcohol use: Not on file     Comment: socially    Drug use: Never       Physical Exam   ED Triage Vitals [24 0412]   Temperature Heart Rate Respirations BP   36.9 °C (98.4 °F) 67 18 144/86      Pulse Ox Temp Source Heart Rate Source Patient Position   97 % Temporal Monitor Sitting      BP Location FiO2 (%)     Right arm --       Physical Exam  Constitutional:       General: She is not in acute distress.     Appearance: She is not ill-appearing, toxic-appearing or diaphoretic.    HENT:      Head: Normocephalic and atraumatic.      Mouth/Throat:      Mouth: Mucous membranes are moist.   Eyes:      General: No scleral icterus.     Extraocular Movements: Extraocular movements intact.      Pupils: Pupils are equal, round, and reactive to light.   Cardiovascular:      Rate and Rhythm: Normal rate and regular rhythm.      Pulses: Normal pulses.   Pulmonary:      Effort: Pulmonary effort is normal. No respiratory distress.      Breath sounds: Normal breath sounds.   Abdominal:      General: There is no distension.      Palpations: Abdomen is soft.   Musculoskeletal:         General: Normal range of motion.      Cervical back: Normal range of motion and neck supple. No rigidity.      Right lower leg: No edema.      Left lower leg: No edema.   Skin:     General: Skin is warm and dry.      Capillary Refill: Capillary refill takes less than 2 seconds.   Neurological:      General: No focal deficit present.      Mental Status: She is alert and oriented to person, place, and time.      Cranial Nerves: No cranial nerve deficit.      Coordination: Coordination normal.      Gait: Gait normal.   Psychiatric:         Mood and Affect: Mood normal.         Behavior: Behavior normal.         Judgment: Judgment normal.         ED Course & MDM   ED Course as of 04/01/24 0557   Mon Apr 01, 2024   0557 04:20 12 lead EKG interpreted by myself and my ED attending reveals sinus rhythm with a rate of 69 beats per minute.  Normal Axis.  There are no ST elevations. T wave inversions in V1. No acute ischemic changes identified.  [EH]      ED Course User Index  [EH] Yoselin Salas PA-C       Medical Decision Making  ED course / MDM     Summary:  Patient presented with a possible anxiety attack.  Symptoms have significantly improved.  Currently states she just feels off, and has a mild generalized aching headache.  Vital signs are stable, patient is very well-appearing.  Ambulates unassisted, no acute distress.  Normal  neurologic exam.  Lungs clear to auscultation, heart regular rate and rhythm.  No motor or sensory deficits.  No ataxia.  Symptoms do appear consistent with an anxiety attack, but will evaluate labs and EKG. Patient given a dose of Ativan and Tylenol.    Patient will be signed out to oncoming ED attending Dr. Werner at 0600 due to shift change, pending labs, reevaluation, and final disposition.      This note has been transcribed using voice recognition and may contain grammatical errors, misplaced words, incorrect words, incorrect phrases or other errors.   Procedure  Procedures     Yoselin Salas PA-C  04/01/24 0612

## 2024-04-01 NOTE — ED TRIAGE NOTES
Pt arrived to the ED with a chief complaint of not feeling well. Pt endorses heart racing, nausea, feeling dry, headache, and tingling. Pt was scene by EMS and told her vitals were wnl, was told to go to er if anything changes. Hx of anxiety but this is worse than any other attack before. 0230 pt had acute onset of sx that awoke her from sleep. Pt endorses taking ozempic.

## 2024-04-16 ENCOUNTER — HOSPITAL ENCOUNTER (EMERGENCY)
Facility: HOSPITAL | Age: 59
Discharge: HOME | End: 2024-04-16
Attending: EMERGENCY MEDICINE
Payer: COMMERCIAL

## 2024-04-16 VITALS
DIASTOLIC BLOOD PRESSURE: 80 MMHG | WEIGHT: 156 LBS | HEIGHT: 64 IN | RESPIRATION RATE: 18 BRPM | SYSTOLIC BLOOD PRESSURE: 127 MMHG | OXYGEN SATURATION: 97 % | TEMPERATURE: 97.3 F | BODY MASS INDEX: 26.63 KG/M2 | HEART RATE: 83 BPM

## 2024-04-16 DIAGNOSIS — E86.0 DEHYDRATION: ICD-10-CM

## 2024-04-16 DIAGNOSIS — R00.2 PALPITATIONS: Primary | ICD-10-CM

## 2024-04-16 LAB
ALBUMIN SERPL BCP-MCNC: 4.1 G/DL (ref 3.4–5)
ALP SERPL-CCNC: 71 U/L (ref 33–110)
ALT SERPL W P-5'-P-CCNC: 28 U/L (ref 7–45)
ANION GAP SERPL CALC-SCNC: 13 MMOL/L (ref 10–20)
AST SERPL W P-5'-P-CCNC: 20 U/L (ref 9–39)
BASOPHILS # BLD AUTO: 0.05 X10*3/UL (ref 0–0.1)
BASOPHILS NFR BLD AUTO: 0.8 %
BILIRUB SERPL-MCNC: 0.6 MG/DL (ref 0–1.2)
BUN SERPL-MCNC: 24 MG/DL (ref 6–23)
CALCIUM SERPL-MCNC: 9.1 MG/DL (ref 8.6–10.3)
CARDIAC TROPONIN I PNL SERPL HS: 3 NG/L (ref 0–13)
CHLORIDE SERPL-SCNC: 101 MMOL/L (ref 98–107)
CO2 SERPL-SCNC: 26 MMOL/L (ref 21–32)
CREAT SERPL-MCNC: 1.24 MG/DL (ref 0.5–1.05)
EGFRCR SERPLBLD CKD-EPI 2021: 50 ML/MIN/1.73M*2
EOSINOPHIL # BLD AUTO: 0.22 X10*3/UL (ref 0–0.7)
EOSINOPHIL NFR BLD AUTO: 3.4 %
ERYTHROCYTE [DISTWIDTH] IN BLOOD BY AUTOMATED COUNT: 12.6 % (ref 11.5–14.5)
GLUCOSE SERPL-MCNC: 133 MG/DL (ref 74–99)
HCT VFR BLD AUTO: 34.2 % (ref 36–46)
HGB BLD-MCNC: 12.1 G/DL (ref 12–16)
IMM GRANULOCYTES # BLD AUTO: 0.02 X10*3/UL (ref 0–0.7)
IMM GRANULOCYTES NFR BLD AUTO: 0.3 % (ref 0–0.9)
LYMPHOCYTES # BLD AUTO: 3.02 X10*3/UL (ref 1.2–4.8)
LYMPHOCYTES NFR BLD AUTO: 47.2 %
MAGNESIUM SERPL-MCNC: 2 MG/DL (ref 1.6–2.4)
MCH RBC QN AUTO: 31.5 PG (ref 26–34)
MCHC RBC AUTO-ENTMCNC: 35.4 G/DL (ref 32–36)
MCV RBC AUTO: 89 FL (ref 80–100)
MONOCYTES # BLD AUTO: 0.54 X10*3/UL (ref 0.1–1)
MONOCYTES NFR BLD AUTO: 8.4 %
NEUTROPHILS # BLD AUTO: 2.55 X10*3/UL (ref 1.2–7.7)
NEUTROPHILS NFR BLD AUTO: 39.9 %
NRBC BLD-RTO: 0 /100 WBCS (ref 0–0)
PLATELET # BLD AUTO: 280 X10*3/UL (ref 150–450)
POTASSIUM SERPL-SCNC: 3.4 MMOL/L (ref 3.5–5.3)
PROT SERPL-MCNC: 6.5 G/DL (ref 6.4–8.2)
RBC # BLD AUTO: 3.84 X10*6/UL (ref 4–5.2)
SODIUM SERPL-SCNC: 137 MMOL/L (ref 136–145)
TSH SERPL-ACNC: 2 MIU/L (ref 0.44–3.98)
WBC # BLD AUTO: 6.4 X10*3/UL (ref 4.4–11.3)

## 2024-04-16 PROCEDURE — 84443 ASSAY THYROID STIM HORMONE: CPT | Performed by: EMERGENCY MEDICINE

## 2024-04-16 PROCEDURE — 80053 COMPREHEN METABOLIC PANEL: CPT | Performed by: EMERGENCY MEDICINE

## 2024-04-16 PROCEDURE — 85025 COMPLETE CBC W/AUTO DIFF WBC: CPT | Performed by: EMERGENCY MEDICINE

## 2024-04-16 PROCEDURE — 84484 ASSAY OF TROPONIN QUANT: CPT | Performed by: EMERGENCY MEDICINE

## 2024-04-16 PROCEDURE — 99283 EMERGENCY DEPT VISIT LOW MDM: CPT

## 2024-04-16 PROCEDURE — 83735 ASSAY OF MAGNESIUM: CPT | Performed by: EMERGENCY MEDICINE

## 2024-04-16 PROCEDURE — 36415 COLL VENOUS BLD VENIPUNCTURE: CPT | Performed by: EMERGENCY MEDICINE

## 2024-04-16 ASSESSMENT — PAIN - FUNCTIONAL ASSESSMENT: PAIN_FUNCTIONAL_ASSESSMENT: 0-10

## 2024-04-16 ASSESSMENT — COLUMBIA-SUICIDE SEVERITY RATING SCALE - C-SSRS
2. HAVE YOU ACTUALLY HAD ANY THOUGHTS OF KILLING YOURSELF?: NO
1. IN THE PAST MONTH, HAVE YOU WISHED YOU WERE DEAD OR WISHED YOU COULD GO TO SLEEP AND NOT WAKE UP?: NO
6. HAVE YOU EVER DONE ANYTHING, STARTED TO DO ANYTHING, OR PREPARED TO DO ANYTHING TO END YOUR LIFE?: NO

## 2024-04-16 ASSESSMENT — PAIN SCALES - GENERAL: PAINLEVEL_OUTOF10: 0 - NO PAIN

## 2024-04-16 NOTE — ED PROVIDER NOTES
"HPI   Chief Complaint   Patient presents with    Anxiety    Rapid Heart Rate     Edna Dawson is a 59 y.o. female with complaint of waking up from sleep feeling strange and feporint a rapid heart rate. She stated that she has had some issue with anxiety and \"hopes\" that that is all this is.         Patient presents with acute on chronic palpitations, shortness of breath and paresthesias.  The symptoms are all improved.  She states she woke up in the middle night sometime after midnight.  She went to sleep at 1030.  She does have history anxiety taking BuSpar.  They did attempt to go up to 60 mg of them and back down to 30 since she thought she was having slightly worsening symptoms.  She denies any chest pain currently.  She denies any illness including fever, cough, vomiting.  She states she has some mild nausea.  Denies any diarrhea, dysuria symptoms there.                          Vy Coma Scale Score: 15                     Patient History   Past Medical History:   Diagnosis Date    Metabolic syndrome     Dysmetabolic syndrome X    Personal history of other diseases of the digestive system     History of gastroesophageal reflux (GERD)    Personal history of other diseases of the musculoskeletal system and connective tissue     History of arthritis    Personal history of other venous thrombosis and embolism 2015    History of deep venous thrombosis     Past Surgical History:   Procedure Laterality Date    APPENDECTOMY  2013    Appendectomy    BLADDER SURGERY  2015    Bladder Surgery     SECTION, CLASSIC  2015     Section    OTHER SURGICAL HISTORY  2019    Dilation and curettage    OTHER SURGICAL HISTORY  2019    [place] tooth extraction     Family History   Problem Relation Name Age of Onset    Hypertension Mother      Lymphoma Mother      Hypertension Father      Uterine cancer Maternal Grandmother      Diabetes type II Maternal Grandfather      Cancer " Other family history     Heart disease Other family history     Hypertension Other family history     Other (allergic disorder) Sibling       Social History     Tobacco Use    Smoking status: Never    Smokeless tobacco: Never   Substance Use Topics    Alcohol use: Not on file     Comment: socially    Drug use: Never       Physical Exam   ED Triage Vitals [04/16/24 0033]   Temperature Heart Rate Respirations BP   36.3 °C (97.3 °F) 83 18 127/80      Pulse Ox Temp Source Heart Rate Source Patient Position   97 % Oral Monitor Sitting      BP Location FiO2 (%)     Left arm --       Physical Exam  Vitals and nursing note reviewed.   Constitutional:       General: She is not in acute distress.     Appearance: She is well-developed.   HENT:      Head: Normocephalic and atraumatic.   Eyes:      Conjunctiva/sclera: Conjunctivae normal.   Cardiovascular:      Rate and Rhythm: Normal rate and regular rhythm.      Heart sounds: No murmur heard.  Pulmonary:      Effort: Pulmonary effort is normal. No respiratory distress.      Breath sounds: Normal breath sounds.   Abdominal:      Palpations: Abdomen is soft.      Tenderness: There is no abdominal tenderness.   Musculoskeletal:         General: No swelling.      Cervical back: Neck supple.   Skin:     General: Skin is warm and dry.      Capillary Refill: Capillary refill takes less than 2 seconds.   Neurological:      Mental Status: She is alert.   Psychiatric:         Mood and Affect: Mood normal.         ED Course & MDM   Diagnoses as of 04/16/24 0228   Palpitations   Dehydration       Medical Decision Making    EKG interpreted by myself.  1 PVC otherwise normal sinus rhythm at a rate of 81 bpm.  Normal intervals.  Normal axis.  No signs of acute ischemia.      Patient not suicidal or homicidal.  She denies any systemic symptoms.  I feel that if her labs unremarkable she will be discharged home follow-up with her PCP.  She may need therapy and psychiatry as well.  The patient  has a slight BETTIE with creatinine going up to 1.24.  BUN is also slightly elevated.  She does not drink more than 1 L of water in a day.  She will try to do this more often.  She does not want to stay for IV fluids at this time.  She first discharged home.  Return precautions were given.  Procedure  Procedures     Jose Alfredo Narvaez MD  04/16/24 0111       Jose Alfredo Narvaez MD  04/16/24 0229

## 2024-04-25 ENCOUNTER — OFFICE VISIT (OUTPATIENT)
Dept: ORTHOPEDIC SURGERY | Facility: HOSPITAL | Age: 59
End: 2024-04-25
Payer: COMMERCIAL

## 2024-04-25 ENCOUNTER — HOSPITAL ENCOUNTER (OUTPATIENT)
Dept: RADIOLOGY | Facility: EXTERNAL LOCATION | Age: 59
Discharge: HOME | End: 2024-04-25

## 2024-04-25 DIAGNOSIS — M67.952 TENDINOPATHY OF LEFT GLUTEUS MEDIUS: ICD-10-CM

## 2024-04-25 PROCEDURE — 76942 ECHO GUIDE FOR BIOPSY: CPT | Performed by: FAMILY MEDICINE

## 2024-04-25 PROCEDURE — 99214 OFFICE O/P EST MOD 30 MIN: CPT | Performed by: EMERGENCY MEDICINE

## 2024-04-25 PROCEDURE — 20550 NJX 1 TENDON SHEATH/LIGAMENT: CPT | Performed by: FAMILY MEDICINE

## 2024-04-25 PROCEDURE — 2500000004 HC RX 250 GENERAL PHARMACY W/ HCPCS (ALT 636 FOR OP/ED): Performed by: EMERGENCY MEDICINE

## 2024-04-25 PROCEDURE — 2500000005 HC RX 250 GENERAL PHARMACY W/O HCPCS: Performed by: EMERGENCY MEDICINE

## 2024-04-25 RX ORDER — METHYLPREDNISOLONE ACETATE 40 MG/ML
80 INJECTION, SUSPENSION INTRA-ARTICULAR; INTRALESIONAL; INTRAMUSCULAR; SOFT TISSUE
Status: COMPLETED | OUTPATIENT
Start: 2024-04-25 | End: 2024-04-25

## 2024-04-25 RX ORDER — LIDOCAINE HYDROCHLORIDE 10 MG/ML
4 INJECTION INFILTRATION; PERINEURAL
Status: COMPLETED | OUTPATIENT
Start: 2024-04-25 | End: 2024-04-25

## 2024-04-25 RX ADMIN — METHYLPREDNISOLONE ACETATE 80 MG: 40 INJECTION, SUSPENSION INTRALESIONAL; INTRAMUSCULAR; INTRASYNOVIAL; SOFT TISSUE at 13:40

## 2024-04-25 RX ADMIN — LIDOCAINE HYDROCHLORIDE 4 ML: 10 INJECTION, SOLUTION INFILTRATION; PERINEURAL at 13:40

## 2024-04-25 NOTE — PROGRESS NOTES
Subjective   Edna Dawson is a 59 y.o. female who presents for Pain of the Left Hip    HPI  4/25/24:  59-year-old female who presents today for new onset left hip pain.  She was seen by Dr. Irwin in March of this year and was referred for a ultrasound-guided gluteal tendon injection.  The pain is disrupting her sleep and she is exclusively sleeping on her left side.  She underwent a right gluteal medius injection and 2022 and continues to have relief.  She would like to move forward with the injection today.    1/8/24: Patient returns today for reevaluation for right hip pain.  She was seen on 1/21/2022 and diagnosed with gluteus medius tendinitis.  I did perform a gluteus medius insertion injection for her at that time.  She felt that the injection did provide significant relief.  She was doing quite well up until last month when she developed worsening lateral hip pain.  Her pain has since improved and she is nearly asymptomatic at this time.  She has been working on daily exercises with significant improvement.  No additional complaints.    1/21/22: 57-year-old female presents with complaint of right lateral hip pain that she has had for approximately 9 months. This pain is of gradual onset and intermittent duration, of mild severity. Pain is exacerbated by walking, laying on her right side, and crossing her leg over while performing yoga. Pain is alleviated by rest. Pain is associated with a throbbing and pulling sensation. Pain is associated with acute trauma, deformity, ecchymosis, paresthesias, weakness, rash, erythema, or fevers. She has been taking OTC Motrin, the provides some relief.         ROS: All pertinent positive symptoms are included in the history of present illness.    All other systems have been reviewed and are negative and noncontributory to this patient's current ailments.    Objective     There were no vitals filed for this visit.    Physical Exam  General/Constitutional: No apparent  distress. Well-nourished and well developed.  Head: Normocephalic, Atraumatic.   Eyes: EOMI.  Vascular: No edema, swelling or tenderness, except as noted in detailed exam.  Respiratory: Non-labored breathing.  Integumentary: No impressive skin lesions present, except as noted in detailed exam.  Neurological: Alert and oriented.  Psychological: Normal mood and affect.  Musculoskeletal: Normal, except as noted in detailed exam.     Left hip:   The left hip and pelvis are without obvious signs of acute bony deformity, swelling, erythema, ecchymosis or instability.  Tenderness palpation over the left greater trochanter. Active and passive range of motion is full and pain-free.  Negative log roll is negative. Straight leg raise test is negative.  FADIR is negative. CARLITOS is negative.  Crossover is negative. Hip strength is intact as compared to the opposite hip. The opposite hip is otherwise nontender and stable.    Tendon Sheath Injection (Gluteus medius tendon sheath) on 4/25/2024 1:40 PM  Indications: pain  Details: 25 G needle, ultrasound-guided lateral approach  Medications: 80 mg methylPREDNISolone acetate 40 mg/mL; 4 mL lidocaine 10 mg/mL (1 %)  Outcome: tolerated well, no immediate complications  Procedure, treatment alternatives, risks and benefits explained, specific risks discussed. Immediately prior to procedure a time out was called to verify the correct patient, procedure, equipment, support staff and site/side marked as required. Patient was prepped and draped in the usual sterile fashion.           Assessment/Plan   Problem List Items Addressed This Visit    None  Visit Diagnoses       Tendinopathy of left gluteus medius        Relevant Orders    Point of Care Ultrasound (Completed)          We discussed both conservative and surgical treatments for her left gluteus medius tendinopathy.  We agreed to do a gluteus tendon sheath injection today in the office.  We did discuss that if this is recurrent we can  do Tenex in the future.  Discussed risks versus benefits of having injections performed. Patient has elected to proceed with procedures. Please refer to procedure notes above. Discussed with patient to limit weightbearing activities over the next 24-48 hours, they can then progress to full activities as tolerated. Discussed with patient to avoid water submersion over the next 2 days. Discussed with patient to call me immediately if they develop worsening pain, rash, erythema, or fevers.  Follow-up as needed    Keith Richards, DO  Primary Care Sports Medicine Fellow    ** Please excuse any errors in grammar or translation related to this dictation. Voice recognition software was utilized to prepare this document. **

## 2024-05-13 DIAGNOSIS — N95.0 POSTMENOPAUSAL BLEEDING: Primary | ICD-10-CM

## 2024-05-13 DIAGNOSIS — R10.11 RUQ PAIN: ICD-10-CM

## 2024-05-13 NOTE — PROGRESS NOTES
Pt contacted me, noticed a small amount of VB; [place] but I ordered an US for when she returns

## 2024-05-20 ENCOUNTER — HOSPITAL ENCOUNTER (OUTPATIENT)
Dept: RADIOLOGY | Facility: CLINIC | Age: 59
Discharge: HOME | End: 2024-05-20
Payer: COMMERCIAL

## 2024-05-20 DIAGNOSIS — N95.0 POSTMENOPAUSAL BLEEDING: ICD-10-CM

## 2024-05-20 PROCEDURE — 76830 TRANSVAGINAL US NON-OB: CPT | Performed by: RADIOLOGY

## 2024-05-20 PROCEDURE — 76830 TRANSVAGINAL US NON-OB: CPT

## 2024-05-20 PROCEDURE — 76857 US EXAM PELVIC LIMITED: CPT | Performed by: RADIOLOGY

## 2024-05-20 NOTE — PROGRESS NOTES
Chief Complaint:  1.  Postnasal drainage  2.  Nasal airway obstruction secondary to crusting; chronic rhinitis  3.  Epistaxis  4.  Throat clearing, coughing    History Of Present Illness:    Edna Dawson presents since last being seen 2/14/23.  She has had overall significant improvement in her symptoms using Bactroban intranasal initially consistently and now as needed.  She saw benefit within the first few weeks.  Her last nasal bleeding was very mild and about 1 month ago.    Main Symptoms:  Patient does not have anterior nasal drainage.     Patient does not have  posterior nasal drainage.    Patient does not have nasal airway obstruction.   Patient does not have  facial pain.    Patient does not have  facial pressure.    Patient does not have decreased smell.  Associated Symptoms:   Patient does not have  headaches.    Patient does not have throat clearing.    Patient does not have coughing.    Patient does not have dysphonia.   Patient does not have nasal bleeding.  3-4 weeks ago had a minor bleed    Medications currently on for sinonasal symptoms:   Bactroban now PRN    Active Problems:  Patient Active Problem List   Diagnosis    Anxiety    Atypical chest pain    Back pain, chronic    Backache    BPPV (benign paroxysmal positional vertigo)    Chronic ethmoidal sinusitis    Chronic rhinitis    Closed fracture of finger, phalanx    Compression of vein    Depression    Deviated nasal septum    Disease due to severe acute respiratory syndrome coronavirus 2 (SARS-CoV-2)    DVT (deep venous thrombosis) (Multi)    Dyslipidemia    Dysmetabolic syndrome X    Edema    Esophageal reflux    Finger injury    Heart palpitations    Hemangioma of skin and subcutaneous tissue    Hemorrhoids    Hirsutism    Hypertension    Hyperthyroidism    IGT (impaired glucose tolerance)    Insomnia    Joint pain, knee    Left breast mass    Lichen sclerosus    Lumbar degenerative disc disease    Melanocytic nevi of trunk    Menopausal  state    Muscle spasm disorder of tensor tympani of left ear    Neuropathic pain    Obesity    Other melanin hyperpigmentation    Other seborrheic keratosis    Pain of lower extremity    Panic attacks    Pendulous breast    Polycystic ovarian syndrome    Postnasal drip    Right hip pain    Rotator cuff tendonitis    Sciatica of left side    Sebaceous cyst    Shoulder pain    Sprain and strain of interphalangeal (joint) of hand    Tendinopathy of right gluteus medius    Tinea unguium    Menopausal symptom    UTI symptoms    Condition not found    Melanocytic nevi of lower limb, including hip    Melanocytic nevi of unspecified upper limb, including shoulder    Abnormal mammogram    Hot flashes due to menopause    Left shoulder pain    Lumbar radiculopathy, right    Recurrent major depressive disorder in remission (CMS-HCC)    Right shoulder pain    Subclinical hyperthyroidism    Vaginal atrophy    Vulvar mass    COVID      Past Medical History:  She has a past medical history of Metabolic syndrome, Personal history of other diseases of the digestive system, Personal history of other diseases of the musculoskeletal system and connective tissue, and Personal history of other venous thrombosis and embolism (2015).    Surgical History:  She has a past surgical history that includes Appendectomy (2013); Other surgical history (2019); Other surgical history (2019);  section, classic (2015); and Bladder surgery (2015).     Family History:  Family History   Problem Relation Name Age of Onset    Hypertension Mother      Lymphoma Mother      Hypertension Father      Uterine cancer Maternal Grandmother      Diabetes type II Maternal Grandfather      Cancer Other family history     Heart disease Other family history     Hypertension Other family history     Other (allergic disorder) Sibling       Social History:  She reports that she has never smoked. She has never used smokeless  tobacco. She reports that she does not use drugs. No history on file for alcohol use.     Allergies:  Ace inhibitors    Current Meds:  Current Outpatient Medications:     aspirin 81 mg EC tablet, Take by mouth., Disp: , Rfl:     atorvastatin (Lipitor) 10 mg tablet, Take 1 tablet (10 mg) by mouth once daily at bedtime., Disp: 90 tablet, Rfl: 1    busPIRone (Buspar) 15 mg tablet, Take 1 tablet (15 mg) by mouth 2 times a day., Disp: 120 tablet, Rfl: 1    clobetasol (Temovate) 0.05 % cream, APPLY SPARINGLY TO vulva 3 TIMES A WEEK, Disp: , Rfl:     esomeprazole (NexIUM) 40 mg DR capsule, Take 1 capsule (40 mg) by mouth once daily., Disp: 90 capsule, Rfl: 0    estradiol (Estrace) 0.01 % (0.1 mg/gram) vaginal cream, Apply pea sized amount  to vaginal opening every Monday, Wednesday and Friday evening, Disp: 42.5 g, Rfl: 2    fluticasone (Flonase) 50 mcg/actuation nasal spray, Administer 2 sprays into each nostril once daily as needed., Disp: , Rfl:     hydroCHLOROthiazide (HYDRODiuril) 25 mg tablet, Take 1 tablet (25 mg) by mouth once daily., Disp: , Rfl:     hydrocortisone (Proctozone-HC) 2.5 % rectal cream, apply to affected area bid prn, Disp: , Rfl:     multivitamin tablet, Take 1 tablet by mouth once daily., Disp: , Rfl:     ondansetron ODT (Zofran-ODT) 4 mg disintegrating tablet, Take 1 tablet (4 mg) by mouth every 8 hours if needed for nausea or vomiting., Disp: 15 tablet, Rfl: 0    Ozempic 1 mg/dose (4 mg/3 mL) pen injector, , Disp: , Rfl:     potassium chloride CR 10 mEq ER tablet, Take 1 tablet (10 mEq) by mouth once daily., Disp: , Rfl:     Vitals:  Visit Vitals  [facility] 03/25/2022   OB Status Postmenopausal   Smoking Status Never      Physical Exam:  Ant rhino mild dryness of the anterior nasal cavity bilaterally but no significant crusting or concerning lesions.    Provider Impressions:  1.  Nasal airway obstruction secondary to crusting; chronic rhinitis -- improved    Discussion:  Edna Dawson appeared very  well on examination today.  I think continuation of Bactroban as needed would make a lot of sense for her.  I asked her to follow-up with me as needed for any sinonasal needs or refills of the medical therapy.  All questions were answered.    Stu Simental MD

## 2024-05-21 ENCOUNTER — OFFICE VISIT (OUTPATIENT)
Dept: OTOLARYNGOLOGY | Facility: CLINIC | Age: 59
End: 2024-05-21
Payer: COMMERCIAL

## 2024-05-21 VITALS — BODY MASS INDEX: 26.21 KG/M2 | HEIGHT: 64 IN | WEIGHT: 153.5 LBS | TEMPERATURE: 97.3 F

## 2024-05-21 DIAGNOSIS — R04.0 EPISTAXIS: ICD-10-CM

## 2024-05-21 DIAGNOSIS — J31.0 CHRONIC RHINITIS: Primary | ICD-10-CM

## 2024-05-21 PROCEDURE — 99213 OFFICE O/P EST LOW 20 MIN: CPT | Performed by: OTOLARYNGOLOGY

## 2024-05-21 PROCEDURE — 1036F TOBACCO NON-USER: CPT | Performed by: OTOLARYNGOLOGY

## 2024-05-21 ASSESSMENT — PATIENT HEALTH QUESTIONNAIRE - PHQ9
2. FEELING DOWN, DEPRESSED OR HOPELESS: NOT AT ALL
SUM OF ALL RESPONSES TO PHQ9 QUESTIONS 1 AND 2: 0
1. LITTLE INTEREST OR PLEASURE IN DOING THINGS: NOT AT ALL

## 2024-05-23 ENCOUNTER — TELEPHONE (OUTPATIENT)
Dept: OBSTETRICS AND GYNECOLOGY | Facility: CLINIC | Age: 59
End: 2024-05-23
Payer: COMMERCIAL

## 2024-06-06 DIAGNOSIS — N95.2 VAGINAL ATROPHY: ICD-10-CM

## 2024-06-07 ENCOUNTER — LAB (OUTPATIENT)
Dept: LAB | Facility: LAB | Age: 59
End: 2024-06-07
Payer: COMMERCIAL

## 2024-06-07 DIAGNOSIS — Z00.8 ENCOUNTER FOR OTHER GENERAL EXAMINATION: Primary | ICD-10-CM

## 2024-06-07 LAB
ANION GAP SERPL CALC-SCNC: 13 MMOL/L (ref 10–20)
BUN SERPL-MCNC: 15 MG/DL (ref 6–23)
CALCIUM SERPL-MCNC: 9.8 MG/DL (ref 8.6–10.6)
CHLORIDE SERPL-SCNC: 105 MMOL/L (ref 98–107)
CHOLEST SERPL-MCNC: 266 MG/DL (ref 0–199)
CHOLESTEROL/HDL RATIO: 3.5
CO2 SERPL-SCNC: 30 MMOL/L (ref 21–32)
CREAT SERPL-MCNC: 0.83 MG/DL (ref 0.5–1.05)
EGFRCR SERPLBLD CKD-EPI 2021: 81 ML/MIN/1.73M*2
ERYTHROCYTE [DISTWIDTH] IN BLOOD BY AUTOMATED COUNT: 12.4 % (ref 11.5–14.5)
EST. AVERAGE GLUCOSE BLD GHB EST-MCNC: 91 MG/DL
GLUCOSE SERPL-MCNC: 109 MG/DL (ref 74–99)
HBA1C MFR BLD: 4.8 %
HCT VFR BLD AUTO: 39 % (ref 36–46)
HDLC SERPL-MCNC: 75.2 MG/DL
HGB BLD-MCNC: 13.4 G/DL (ref 12–16)
LDLC SERPL CALC-MCNC: 151 MG/DL
MCH RBC QN AUTO: 32.1 PG (ref 26–34)
MCHC RBC AUTO-ENTMCNC: 34.4 G/DL (ref 32–36)
MCV RBC AUTO: 93 FL (ref 80–100)
NON HDL CHOLESTEROL: 191 MG/DL (ref 0–149)
NRBC BLD-RTO: 0 /100 WBCS (ref 0–0)
PLATELET # BLD AUTO: 363 X10*3/UL (ref 150–450)
POTASSIUM SERPL-SCNC: 4.5 MMOL/L (ref 3.5–5.3)
RBC # BLD AUTO: 4.18 X10*6/UL (ref 4–5.2)
SODIUM SERPL-SCNC: 143 MMOL/L (ref 136–145)
TRIGL SERPL-MCNC: 200 MG/DL (ref 0–149)
VLDL: 40 MG/DL (ref 0–40)
WBC # BLD AUTO: 6 X10*3/UL (ref 4.4–11.3)

## 2024-06-07 PROCEDURE — 85027 COMPLETE CBC AUTOMATED: CPT

## 2024-06-07 PROCEDURE — 36415 COLL VENOUS BLD VENIPUNCTURE: CPT

## 2024-06-07 PROCEDURE — 83036 HEMOGLOBIN GLYCOSYLATED A1C: CPT

## 2024-06-07 PROCEDURE — 80048 BASIC METABOLIC PNL TOTAL CA: CPT

## 2024-06-07 PROCEDURE — 80061 LIPID PANEL: CPT

## 2024-06-07 RX ORDER — ESTRADIOL 0.1 MG/G
CREAM VAGINAL
Qty: 42.5 G | Refills: 2 | Status: SHIPPED | OUTPATIENT
Start: 2024-06-07

## 2024-06-11 DIAGNOSIS — Z12.11 COLON CANCER SCREENING: Primary | ICD-10-CM

## 2024-06-11 RX ORDER — SOD SULF/POT CHLORIDE/MAG SULF 1.479 G
TABLET ORAL
Qty: 24 TABLET | Refills: 0 | Status: SHIPPED | OUTPATIENT
Start: 2024-06-11

## 2024-07-02 ENCOUNTER — TELEPHONE (OUTPATIENT)
Dept: OBSTETRICS AND GYNECOLOGY | Facility: CLINIC | Age: 59
End: 2024-07-02
Payer: COMMERCIAL

## 2024-07-02 DIAGNOSIS — R92.2 DENSE BREAST: Primary | ICD-10-CM

## 2024-07-02 DIAGNOSIS — R92.30 DENSE BREAST: Primary | ICD-10-CM

## 2024-07-02 NOTE — TELEPHONE ENCOUNTER
Pt verified by name and .  Pt calling regarding fast mri.  Pt has upcoming mammogram requesting fast mri.  Pt is aware insurance does not cover it.   Pt is aware it is approx $250.00.  Pt is aware nurse will send order to Dr. Soriano for her to sign order.  Pt has no questions at this time.

## 2024-07-10 ENCOUNTER — TELEPHONE (OUTPATIENT)
Dept: OBSTETRICS AND GYNECOLOGY | Facility: CLINIC | Age: 59
End: 2024-07-10
Payer: COMMERCIAL

## 2024-07-10 NOTE — TELEPHONE ENCOUNTER
Nurse called [facility] MRI dept.  Spoke to June.  Per June pt may have fast breast MRI if she has breast clip.  Message sent to Dr. Soriano.

## 2024-07-16 DIAGNOSIS — Z12.31 BREAST CANCER SCREENING BY MAMMOGRAM: ICD-10-CM

## 2024-07-19 DIAGNOSIS — E87.6 HYPOKALEMIA: ICD-10-CM

## 2024-07-22 RX ORDER — POTASSIUM CHLORIDE 750 MG/1
10 TABLET, EXTENDED RELEASE ORAL DAILY
Qty: 30 TABLET | Refills: 0 | Status: SHIPPED | OUTPATIENT
Start: 2024-07-22

## 2024-07-23 ENCOUNTER — HOSPITAL ENCOUNTER (OUTPATIENT)
Dept: RADIOLOGY | Facility: CLINIC | Age: 59
Discharge: HOME | End: 2024-07-23
Payer: COMMERCIAL

## 2024-07-23 VITALS — HEIGHT: 64 IN | WEIGHT: 153.44 LBS | BODY MASS INDEX: 26.2 KG/M2

## 2024-07-23 DIAGNOSIS — Z01.419 ENCOUNTER FOR ANNUAL ROUTINE GYNECOLOGICAL EXAMINATION: ICD-10-CM

## 2024-07-23 PROCEDURE — 77067 SCR MAMMO BI INCL CAD: CPT

## 2024-07-30 ENCOUNTER — APPOINTMENT (OUTPATIENT)
Dept: GASTROENTEROLOGY | Facility: EXTERNAL LOCATION | Age: 59
End: 2024-07-30
Payer: COMMERCIAL

## 2024-07-30 ENCOUNTER — LAB REQUISITION (OUTPATIENT)
Dept: LAB | Facility: HOSPITAL | Age: 59
End: 2024-07-30
Payer: COMMERCIAL

## 2024-07-30 DIAGNOSIS — Z86.010 PERSONAL HISTORY OF COLONIC POLYPS: ICD-10-CM

## 2024-07-30 DIAGNOSIS — Z12.11 SPECIAL SCREENING FOR MALIGNANT NEOPLASMS, COLON: Primary | ICD-10-CM

## 2024-07-30 DIAGNOSIS — D12.0 BENIGN NEOPLASM OF CECUM: ICD-10-CM

## 2024-07-30 DIAGNOSIS — D12.2 BENIGN NEOPLASM OF ASCENDING COLON: ICD-10-CM

## 2024-07-30 PROCEDURE — 88305 TISSUE EXAM BY PATHOLOGIST: CPT

## 2024-07-30 PROCEDURE — 45380 COLONOSCOPY AND BIOPSY: CPT | Performed by: INTERNAL MEDICINE

## 2024-08-02 LAB
LABORATORY COMMENT REPORT: NORMAL
PATH REPORT.FINAL DX SPEC: NORMAL
PATH REPORT.GROSS SPEC: NORMAL
PATH REPORT.RELEVANT HX SPEC: NORMAL
PATH REPORT.TOTAL CANCER: NORMAL

## 2024-09-03 ENCOUNTER — HOSPITAL ENCOUNTER (EMERGENCY)
Facility: HOSPITAL | Age: 59
Discharge: HOME | End: 2024-09-03
Attending: STUDENT IN AN ORGANIZED HEALTH CARE EDUCATION/TRAINING PROGRAM
Payer: COMMERCIAL

## 2024-09-03 VITALS
OXYGEN SATURATION: 99 % | TEMPERATURE: 97.3 F | SYSTOLIC BLOOD PRESSURE: 128 MMHG | HEIGHT: 64 IN | WEIGHT: 160 LBS | RESPIRATION RATE: 16 BRPM | DIASTOLIC BLOOD PRESSURE: 80 MMHG | BODY MASS INDEX: 27.31 KG/M2 | HEART RATE: 59 BPM

## 2024-09-03 DIAGNOSIS — R04.0 EPISTAXIS: Primary | ICD-10-CM

## 2024-09-03 PROCEDURE — 99282 EMERGENCY DEPT VISIT SF MDM: CPT

## 2024-09-03 ASSESSMENT — PAIN SCALES - GENERAL: PAINLEVEL_OUTOF10: 0 - NO PAIN

## 2024-09-03 ASSESSMENT — PAIN - FUNCTIONAL ASSESSMENT: PAIN_FUNCTIONAL_ASSESSMENT: 0-10

## 2024-09-04 NOTE — ED PROVIDER NOTES
"[facility] ED Note    Date of Service: 9/3/2024  Reason for Visit: Epistaxis (Nose Bleed)      Patient History     HPI  Edna Dawson is a 59 y.o. female with past medical history as below who presents emergency department for nosebleed.  Patient states that she is treating an infection in her nose with mupirocin.  She states she put the antibiotic ointment on earlier with a Q-tip, pulled acute back out and noticed a bleed.  She states that the bleeding seemed brisk, was getting her mouth and she was very concerned.  She put a tampon in but given the amount of blood she presented the ED for further evaluation.  She denies any other symptoms at this time.      Past Medical History:   Diagnosis Date    Metabolic syndrome     Dysmetabolic syndrome X    Personal history of other diseases of the digestive system     History of gastroesophageal reflux (GERD)    Personal history of other diseases of the musculoskeletal system and connective tissue     History of arthritis    Personal history of other venous thrombosis and embolism 2015    History of deep venous thrombosis     Past Surgical History:   Procedure Laterality Date    APPENDECTOMY  2013    Appendectomy    BLADDER SURGERY  2015    Bladder Surgery    BREAST BIOPSY       SECTION, CLASSIC  2015     Section    OTHER SURGICAL HISTORY  2019    Dilation and curettage    OTHER SURGICAL HISTORY  2019    [place] tooth extraction         Physical Exam     Vitals:    24 2242   BP: 128/80   BP Location: Left arm   Patient Position: Sitting   Pulse: 59   Resp: 16   Temp: 36.3 °C (97.3 °F)   TempSrc: Oral   SpO2: 99%   Weight: 72.6 kg (160 lb)   Height: 1.626 m (5' 4\")     General: Age-appropriate female, nondistressed, sitting comfortably in the chair, with a tampon in her left nose, no acute distress.  ENT:  No oropharyngeal edema. Tolerating secretions.  Bilateral naris without any active bleeding.  Tampon " removed without any residual bleeding appreciated.  Very small amount of dried blood at the left naris  Pulmonary:   Non-labored breathing.  Symmetric chest rise  Cardiac:  Regular rate   Abdomen:  Non-distended  Musculoskeletal:  No peripheral edema   Skin:  Dry, no rashes  Neuro: Alert and orient x 4.  Moves all 4 extremity spontaneously.    Diagnostic Studies     Labs:  Please see EMR for labs obtained during this patient encounter.    Radiology:  Please see EMR for imaging obtained during this patient encounter.    ED Course and MDM     Edna Dawson is a 59 y.o. female with a history and presentation as described above in HPI.      Upon presentation, the patient was afebrile, well-appearing, with unremarkable vital signs.  Patient presented to the emergency department with epistaxis that is now resolved on my assessment.  Patient appeared well, is not on anticoagulation, has no signs of any ongoing bleeding.  Given that the bleeding is stopped, she is appropriate for discharge.  She is not any signs/symptoms suggestive of significant blood loss.  She was given return precautions, instructed on what to do if she should have a recurrent nosebleed instructed to follow-up with her primary doctor as needed.      Impression     Diagnoses as of 09/03/24 2331   Epistaxis        Plan       Discharge, see DCI provided      Rizwan Wynne MD  [facility] Emergency Medicine         Rizwan Wynne MD  09/03/24 0761

## 2024-09-04 NOTE — DISCHARGE INSTRUCTIONS
You are seen in the emergency department for a nosebleed.  You were able to get the nosebleed to stop on my assessment.  We did talk that this is most likely the front of your nose.  If you get a repeat nosebleed, you may use the clips, and sit forward.  You may also use over-the-counter Afrin, this is a nasal spray.  Please do not use this within 3 days in a row as it can have a rebound effect and cause worsening nasal congestion.  If you have a recurrent nosebleed you can try the clips at home and a tampon.  If you continue to feel the blood running down your throat, feel faint, or are concerned the amount of bloody male was conducted the emergency department.

## 2024-09-04 NOTE — ED TRIAGE NOTES
Edna ARAUJO Dawson is a 59 y.o. female with complaint of nose bleed that started about 2 hours ago. She has utilized a tampon to attempt to stop the bleeding but each time she has removed it the bleeding has started again.

## 2024-09-05 NOTE — PROGRESS NOTES
Chief Complaint:  1.  Nasal airway obstruction secondary to crusting; chronic rhinitis -- improved    History Of Present Illness:    Edna Dawson presents since last being seen 5/21/24.     On 9/3 she presented to the ED for epistaxis from the left nostril after applying Mupirocin ointment to her nostrils with a Q-tip. This bleeding episode lasted about 30 - 45 minutes. She packed herself with a tampon which was removed in the ED. She was hemostatic at that time. She has not has any further bleeding episodes.  When she was actively bleeding the blood was coming out of the front of her nose and going down the back of her throat.  Cauterization was not performed in the emergency department as she had stopped bleeding.    She also complains of bilateral alternating nasal blockage at night (L>R).     She also mentioned new onset issues related to lightheadedness that began in June 2024.  She has had vertigo previously and this was different from those experiences.  She was evaluated through physical therapy who felt this was related to her neck musculature and she has been doing exercises and deep tissue massage with some benefit.    She also mention right-sided intermittent tinnitus.  She has had an MRI of her brain performed August 21, 2024.  This was done through [facility].    MR brain w and wo IV contrast  Order: [id number]  Narrative    EXAMINATION: MR HEAD W/+W/O 08/21/2024 09:07 AM  CLINICAL HISTORY: Tinnitus; Eval for vestibular neuroma in addition to brain MRI  ASSOCIATED DIAGNOSIS: Tinnitus of right ear  Benign paroxysmal vertigo, unspecified laterality  ORDERING PROVIDER: MARINO LAWLER  TECHNOLOGISTS NOTE:  Per Rad sammy, Brain and iacs  COMPARISON: None  TECHNIQUE: Patient questionnaire was completed, and was reviewed by MRI personnel prior to the patient entering the scanner. Multiplanar, multisequence MR imaging of the head was performed with and without intravenous contrast.  INTRA-PROCEDURE MEDS:  Gadoterate Meglumine (DOTAREM) 10 MMOL/20ML solution 20 mL Route: Intravenous Push    FINDINGS:    Skull base: No evidence of a mass at the caroticojugular spines, temporal bones, posterior fossa or internal auditory canals.    Jugular: No high riding jugular leeanne or jugular diverticulum.    Signs of intracranial hypertension: None. Normal sella for age. Normal optic nerve sheaths. No posterior scleral flattening. No significant transverse sinus stenosis.    Cerebellar tonsils: Normal.    Brain parenchyma: No acute infarct or hemorrhage. No mass effect or herniation. White matter is within normal limits for age.    Ventricles/extra-axial spaces: Normal.    Extracranial structures: Paranasal sinuses are clear. Mastoid air cells are clear. Normal marrow signal.    IMPRESSION:  No potential cause of tinnitus.    Main Symptoms:  Patient does not have anterior nasal drainage.  Patient does not have posterior nasal drainage.  Patient has nasal airway obstruction. (L>R), alternating, only at night.   Patient does not have facial pain.  Patient does not have facial pressure.  Patient does not have decreased sense of smell.   Associated Symptoms:  Patient does not have headaches.  Patient has throat clearing. - mild.   Patient does not have coughing.  Patient does not have dysphonia.  Patient does not have sneezing.  Patient does not have itchy eyes.  Patient has nasal bleeding - last episode 9/3/24.     Medications currently on for sinonasal symptoms:  Mupirocin PRN (less than once per day)  Medications previously on for sinonasal symptoms:  Flonase Sensamist  Saline irrigations     Active Problems:  Patient Active Problem List   Diagnosis    Anxiety    Atypical chest pain    Back pain, chronic    Backache    BPPV (benign paroxysmal positional vertigo)    Chronic ethmoidal sinusitis    Chronic rhinitis    Closed fracture of finger, phalanx    Compression of vein    Depression    Deviated nasal septum    Disease due to severe  acute respiratory syndrome coronavirus 2 (SARS-CoV-2)    DVT (deep venous thrombosis) (Multi)    Dyslipidemia    Dysmetabolic syndrome X    Edema    Esophageal reflux    Finger injury    Heart palpitations    Hemangioma of skin and subcutaneous tissue    Hemorrhoids    Hirsutism    Hypertension    Hyperthyroidism    IGT (impaired glucose tolerance)    Insomnia    Joint pain, knee    Left breast mass    Lichen sclerosus    Lumbar degenerative disc disease    Melanocytic nevi of trunk    Menopausal state    Muscle spasm disorder of tensor tympani of left ear    Neuropathic pain    Obesity    Other melanin hyperpigmentation    Other seborrheic keratosis    Pain of lower extremity    Panic attacks    Pendulous breast    Polycystic ovarian syndrome    Postnasal drip    Right hip pain    Rotator cuff tendonitis    Sciatica of left side    Sebaceous cyst    Shoulder pain    Sprain and strain of interphalangeal (joint) of hand    Tendinopathy of right gluteus medius    Tinea unguium    Menopausal symptom    UTI symptoms    Condition not found    Melanocytic nevi of lower limb, including hip    Melanocytic nevi of unspecified upper limb, including shoulder    Abnormal mammogram    Hot flashes due to menopause    Left shoulder pain    Lumbar radiculopathy, right    Recurrent major depressive disorder in remission (CMS-HCC)    Right shoulder pain    Subclinical hyperthyroidism    Vaginal atrophy    Vulvar mass    COVID     Past Medical History:  She has a past medical history of Metabolic syndrome, Personal history of other diseases of the digestive system, Personal history of other diseases of the musculoskeletal system and connective tissue, and Personal history of other venous thrombosis and embolism (2015).    Surgical History:  She has a past surgical history that includes Appendectomy (2013); Other surgical history (2019); Other surgical history (2019);  section, classic (2015);  Bladder surgery (12/22/2015); and Breast biopsy.     Family History:  Family History   Problem Relation Name Age of Onset    Ovarian cancer Mother      Hypertension Mother      Lymphoma Mother      Hypertension Father      Uterine cancer Maternal Grandmother      Diabetes type II Maternal Grandfather      Other (allergic disorder) Sibling      Cancer Other family history     Heart disease Other family history     Hypertension Other family history      Social History:  She reports that she has never smoked. She has never used smokeless tobacco. She reports that she does not use drugs. No history on file for alcohol use.     Allergies:  Ace inhibitors    Current Meds:  Current Outpatient Medications:     aspirin 81 mg EC tablet, Take by mouth., Disp: , Rfl:     atorvastatin (Lipitor) 10 mg tablet, Take 1 tablet (10 mg) by mouth once daily at bedtime., Disp: 90 tablet, Rfl: 1    busPIRone (Buspar) 15 mg tablet, Take 1 tablet (15 mg) by mouth 2 times a day., Disp: 120 tablet, Rfl: 1    clobetasol (Temovate) 0.05 % cream, APPLY SPARINGLY TO vulva 3 TIMES A WEEK, Disp: , Rfl:     esomeprazole (NexIUM) 40 mg DR capsule, Take 1 capsule (40 mg) by mouth once daily., Disp: 90 capsule, Rfl: 0    estradiol (Estrace) 0.01 % (0.1 mg/gram) vaginal cream, Apply pea sized amount  to vaginal opening every Monday, Wednesday and Friday evening, Disp: 42.5 g, Rfl: 2    fluticasone (Flonase) 50 mcg/actuation nasal spray, Administer 2 sprays into each nostril once daily as needed., Disp: , Rfl:     hydroCHLOROthiazide (HYDRODiuril) 25 mg tablet, Take 1 tablet (25 mg) by mouth once daily., Disp: , Rfl:     hydrocortisone (Proctozone-HC) 2.5 % rectal cream, apply to affected area bid prn, Disp: , Rfl:     multivitamin tablet, Take 1 tablet by mouth once daily., Disp: , Rfl:     ondansetron ODT (Zofran-ODT) 4 mg disintegrating tablet, Take 1 tablet (4 mg) by mouth every 8 hours if needed for nausea or vomiting., Disp: 15 tablet, Rfl: 0     oxybutynin (Ditropan) 5 mg tablet, TAKE ONE TABLET BY MOUTH TWO TIMES A DAY, Disp: 90 tablet, Rfl: 2    Ozempic 1 mg/dose (4 mg/3 mL) pen injector, , Disp: , Rfl:     potassium chloride CR 10 mEq ER tablet, TAKE ONE TABLET BY MOUTH ONCE DAILY, Disp: 30 tablet, Rfl: 0    sod sulf-pot chloride-mag sulf (Sutab) 1.479-0.188- 0.225 gram tablet, Starting at 6pm open one bottle of pills and fill glass provided with water and drink according to prep sheet. Start the 2nd bottle with directions on prep sheet 5 hours before procedure time, Disp: 24 tablet, Rfl: 0    Vitals:  Visit Vitals  LMP 03/25/2022   OB Status Postmenopausal   Smoking Status Never     Physical Exam:  Nose: On external exam there are neither lesions nor asymmetry of the nasal tip/dorsum. On anterior rhinoscopy, visualization posteriorly is limited on anterior examination. For this reason, to adequately evaluate posteriorly for masses, source of epistaxis, polypoid disease, debridement, and/or signs of infections, nasal endoscopy is indicated.  (Please see procedure below.)    SINONASAL ENDOSCOPY (CPT 94565): To better evaluate the patient's symptoms, sinonasal endoscopy is indicated.  After discussion of risks and benefits, and topical decongestion and anesthesia, an endoscope was used to perform nasal endoscopy on each side.  A time out identifying the patient, the procedure, the location of the procedure and any concerns was performed prior to beginning the procedure.    Findings:  Examination of the right nasal cavity revealed dryness of the nasal cavity mucosa.  There was no evidence of purulence or polyposis at the middle meatus or sphenoethmoid recess.  Examination of the left nasal cavity revealed dryness of the nasal cavity mucosa.  There was no evidence of purulence or polyposis at the middle meatus or sphenoethmoid recess.  There were no concerning lesions within either nasal cavity.    Results/Data:  I reviewed the ED record from September 3,  2024.    Provider Impressions:  1.  Nasal airway obstruction secondary to crusting; chronic rhinitis; epistaxis  2.  Throat clearing  3.  Right intermittent tinnitus    Discussion:  Edna Dawson and I reviewed her current exam and symptoms.  We reviewed different techniques to apply the Bactroban to minimize the chance of further bleeding with application.    We again reviewed what to do for an acute bleed and management strategies.  I think placing the tampon within the front of her left nasal cavity likely forced the blood posteriorly.  There are over-the-counter products that are designed for epistaxis control that can be placed into the nasal cavities and I suggested that she look into 1 of these.    In regard to the right sided tinnitus.  She has had an MRI performed through [facility] that we will request today so it is available for review.  I recommended that she be assessed by my otology partner, Brigitte Calvillo CNP, with a same-day audiogram.  We discussed the rationale for the audiogram together today.  I ordered the hearing test today.    I recommended follow-up with me as needed moving forward.  She was amenable to this and all questions were answered.    Between face-to-face contact, review of the medical record, and documentation I spent 32 minutes on this evaluation during the day of service.    Scribe Attestation  By signing my name below, I, Cassi Jones, attest that this documentation has been prepared under the direction and in the presence of Stu Simental MD.    Signature:  Stu Simental MD

## 2024-09-09 ENCOUNTER — APPOINTMENT (OUTPATIENT)
Dept: OTOLARYNGOLOGY | Facility: CLINIC | Age: 59
End: 2024-09-09
Payer: COMMERCIAL

## 2024-09-09 VITALS — WEIGHT: 166.1 LBS | BODY MASS INDEX: 30.57 KG/M2 | HEIGHT: 62 IN

## 2024-09-09 DIAGNOSIS — J31.0 CHRONIC RHINITIS: Primary | ICD-10-CM

## 2024-09-09 DIAGNOSIS — J34.89 NASAL CRUSTING: ICD-10-CM

## 2024-09-09 DIAGNOSIS — R04.0 EPISTAXIS: ICD-10-CM

## 2024-09-09 DIAGNOSIS — H93.11 TINNITUS OF RIGHT EAR: Primary | ICD-10-CM

## 2024-09-09 DIAGNOSIS — J31.0 CHRONIC RHINITIS: ICD-10-CM

## 2024-09-09 PROCEDURE — 99214 OFFICE O/P EST MOD 30 MIN: CPT | Performed by: OTOLARYNGOLOGY

## 2024-09-09 PROCEDURE — 3008F BODY MASS INDEX DOCD: CPT | Performed by: OTOLARYNGOLOGY

## 2024-09-09 PROCEDURE — 31231 NASAL ENDOSCOPY DX: CPT | Performed by: OTOLARYNGOLOGY

## 2024-09-09 PROCEDURE — 1036F TOBACCO NON-USER: CPT | Performed by: OTOLARYNGOLOGY

## 2024-09-09 RX ORDER — CARVEDILOL 3.12 MG/1
3.12 TABLET ORAL
COMMUNITY
Start: 2024-08-12

## 2024-09-09 ASSESSMENT — PATIENT HEALTH QUESTIONNAIRE - PHQ9
2. FEELING DOWN, DEPRESSED OR HOPELESS: NOT AT ALL
1. LITTLE INTEREST OR PLEASURE IN DOING THINGS: NOT AT ALL
SUM OF ALL RESPONSES TO PHQ9 QUESTIONS 1 AND 2: 0

## 2024-09-18 ENCOUNTER — HOSPITAL ENCOUNTER (OUTPATIENT)
Dept: RADIOLOGY | Facility: HOSPITAL | Age: 59
Discharge: HOME | End: 2024-09-18

## 2024-09-18 DIAGNOSIS — R92.2 DENSE BREAST: ICD-10-CM

## 2024-09-18 DIAGNOSIS — R92.30 DENSE BREAST: ICD-10-CM

## 2024-09-18 PROCEDURE — 6100000003 BI MR BREAST BILATERAL WITH CONTRAST FAST SCREENING SELF PAY

## 2024-09-18 PROCEDURE — A9575 INJ GADOTERATE MEGLUMI 0.1ML: HCPCS | Performed by: OBSTETRICS & GYNECOLOGY

## 2024-09-18 PROCEDURE — 2550000001 HC RX 255 CONTRASTS: Performed by: OBSTETRICS & GYNECOLOGY

## 2024-09-18 RX ORDER — GADOTERATE MEGLUMINE 376.9 MG/ML
15 INJECTION INTRAVENOUS
Status: COMPLETED | OUTPATIENT
Start: 2024-09-18 | End: 2024-09-18

## 2024-09-27 ENCOUNTER — LAB (OUTPATIENT)
Dept: LAB | Facility: LAB | Age: 59
End: 2024-09-27
Payer: COMMERCIAL

## 2024-09-27 DIAGNOSIS — E78.89 OTHER LIPOPROTEIN METABOLISM DISORDERS: Primary | ICD-10-CM

## 2024-09-27 LAB
CHOLEST SERPL-MCNC: 272 MG/DL (ref 0–199)
CHOLESTEROL/HDL RATIO: 3.7
HDLC SERPL-MCNC: 72.6 MG/DL
LDLC SERPL CALC-MCNC: 170 MG/DL
NON HDL CHOLESTEROL: 199 MG/DL (ref 0–149)
TRIGL SERPL-MCNC: 147 MG/DL (ref 0–149)
VLDL: 29 MG/DL (ref 0–40)

## 2024-09-27 PROCEDURE — 80061 LIPID PANEL: CPT

## 2024-09-27 PROCEDURE — 36415 COLL VENOUS BLD VENIPUNCTURE: CPT

## 2024-10-03 ENCOUNTER — CLINICAL SUPPORT (OUTPATIENT)
Dept: AUDIOLOGY | Facility: CLINIC | Age: 59
End: 2024-10-03
Payer: COMMERCIAL

## 2024-10-03 ENCOUNTER — APPOINTMENT (OUTPATIENT)
Dept: OTOLARYNGOLOGY | Facility: CLINIC | Age: 59
End: 2024-10-03
Payer: COMMERCIAL

## 2024-10-03 VITALS — WEIGHT: 165 LBS | BODY MASS INDEX: 28.17 KG/M2 | TEMPERATURE: 96.7 F | HEIGHT: 64 IN

## 2024-10-03 DIAGNOSIS — M43.6 NECK STIFFNESS: ICD-10-CM

## 2024-10-03 DIAGNOSIS — H93.11 TINNITUS OF RIGHT EAR: Primary | ICD-10-CM

## 2024-10-03 DIAGNOSIS — M54.2 NECK PAIN: ICD-10-CM

## 2024-10-03 DIAGNOSIS — H93.11 TINNITUS OF RIGHT EAR: ICD-10-CM

## 2024-10-03 PROCEDURE — 99213 OFFICE O/P EST LOW 20 MIN: CPT | Performed by: NURSE PRACTITIONER

## 2024-10-03 PROCEDURE — 92550 TYMPANOMETRY & REFLEX THRESH: CPT

## 2024-10-03 PROCEDURE — 1036F TOBACCO NON-USER: CPT | Performed by: NURSE PRACTITIONER

## 2024-10-03 PROCEDURE — 3008F BODY MASS INDEX DOCD: CPT | Performed by: NURSE PRACTITIONER

## 2024-10-03 PROCEDURE — 92557 COMPREHENSIVE HEARING TEST: CPT

## 2024-10-03 ASSESSMENT — PATIENT HEALTH QUESTIONNAIRE - PHQ9
1. LITTLE INTEREST OR PLEASURE IN DOING THINGS: NOT AT ALL
2. FEELING DOWN, DEPRESSED OR HOPELESS: NOT AT ALL
SUM OF ALL RESPONSES TO PHQ9 QUESTIONS 1 AND 2: 0

## 2024-10-03 ASSESSMENT — PAIN SCALES - GENERAL: PAINLEVEL: 0-NO PAIN

## 2024-10-03 NOTE — PROGRESS NOTES
AUDIOLOGY ADULT EVALUATION      Name:  Edna Dawson   :  1965  Age:  59 y.o.  Date of Evaluation:  10/3/2024    Time: 14:20-14:40    IMPRESSIONS     Today's testing revealed normal middle ear functioning, normal hearing sensitivity 125-8000 Hz, and excellent word understanding in both ears.     RECOMMENDATIONS     Continue medical follow up with primary care provider and/or Ears Nose and Throat (ENT) provider as recommended.  Return for audiologic evaluation annually to monitor hearing sensitivity or sooner should concerns arise. The audiology department can be reached at [phone] to schedule an appointment.   Avoid exposure to loud sounds by moving away from the noise, turning down the volume, or wearing proper hearing protection correctly.  Consider use of tinnitus management options, which include but are not limited to the following: sound therapy (use of pleasant or calming sounds that diminish the presence of tinnitus); mindfulness, meditation, and breathing exercises; sleep hygiene; mental health evaluation and formal therapies; psychiatric management of psychopharmaceuticals; dental/orthodontia evaluation; dietary changes (reduction of sodium, caffeine, alcohol); lifestyle changes (exercise, etc.); use of hearing protection and avoidance of loud noise; and formal tinnitus therapies (Tinnitus Retraining Therapy/ TRT, Progressive Tinnitus Management, Tinnitus Activities Treatment, Cognitive Behavioral Therapy).    HISTORY     Ms. Dawson, 59 y.o., was seen today for an initial audiologic evaluation in conjunction with an evaluation with Brigitte Calvillo APRN.CNP, due to tinnitus in the right ear for the past 6-8 months.     History obtained from patient report and chart review. MRI was reportedly completed which did not show any abnormal findings. She has been receiving physical therapy due to concerns for tension in her shoulders and neck that were previously resulting in light-headedness.      Change in Hearing: denied  Difficult listening environments: none  Tinnitus:   Yes, in the right ear; constant and described as a high-pitched ringing sensation  Otalgia: denied  Aural Pressure/Fullness: denied  Recent Ear Infections/Otorrhea: denied  Dizziness: denied  History of Ear Surgeries: denied  History of Noise Exposure: denied  Hearing Aid Use: none  Family History of Hearing Loss: denied  Falls within the last year: denied  Other Significant History: denied    EVALUATION          TEST RESULTS     Otoscopic Evaluation:  Right Ear: Non-occluding cerumen, tympanic membrane visualized.  Left Ear: Ear canal clear, tympanic membrane visualized.    Tympanometry (226 Hz): This test is an objective evaluation of middle ear function. CPT code: 31408   Right Ear: Type A, middle ear pressure and tympanic membrane compliance within normal limits.   Left Ear: Type A, middle ear pressure and tympanic membrane compliance within normal limits.     Acoustic Reflexes: This test is an objective measure of auditory and facial nerve pathways.   (Probe) Right Ear (ipsi right stimulus ear; contralateral left stimulus ear): (Ipsilateral) Responses present at expected levels for 500-4000 Hz.  (Probe) Left Ear (ipsi left stimulus ear; contralateral right stimulus ear):  (Ipsilateral) Responses present at expected levels for 500-4000 Hz.    Distortion Product Otoacoustic Emissions (DPOAE): This test is a measurement of responses which are generated by the cochlea when it is simultaneously stimulated by two pure tone frequencies. CPT code: 07869   Right Ear: Essentially present. Responses present at 7558-5913 Hz. Response(s) absent at 8000 Hz.  Left Ear:  Essentially present. Responses present at 3719-5428 Hz. Response(s) absent at 0294-0383 Hz.  Present OAEs suggest normal or near cochlear outer hair cell function for corresponding frequency region(s). Absent OAEs with normal middle ear function can be consistent with some  degree of hearing loss. Assessment of cochlear outer hair cell function may be impacted by outer or middle ear function.    Test technique: Conventional Audiometry via headphones. This test is an evaluation hearing sensitivity via air and bone conduction and speech recognition testing. CPT code: 82240  Reliability:  good    Pure Tone Audiometry:     Right Ear: Hearing sensitivity within normal limits for 125-8000 Hz.  Left Ear: Hearing sensitivity within normal limits for 125-8000 Hz.    Speech Audiometry:   Right Ear: Speech Reception Threshold (SRT) was obtained at 5 dB HL. This is in good agreement with three frequency Pure Tone Average.   Word Recognition scores were excellent (100%) in quiet when words were presented at 50 dB HL. These results are based on [facility] Auditory Test No.6 (NU-6) Ordered by difficulty (N=10).  Left Ear:  Speech Reception Threshold (SRT) was obtained at 5 dB HL. This is in good agreement with three frequency Pure Tone Average.   Word Recognition scores were excellent (100%) in quiet when words were presented at 50 dB HL. These results are based on [facility] Auditory Test No.6 (NU-6) Ordered by difficulty (N=10).     Comparison of today's results with previous test results: No previous results available.      SARAH Rosado, CCC-A  Licensed Clinical Audiologist    Degree of   Hearing Sensitivity dB Range   Within Normal Limits (WNL) 0 - 20   Slight 25   Mild 26 - 40   Moderate 41 - 55   Moderately-Severe 56 - 70   Severe 71 - 90   Profound 91 +     Key   CHL Conductive Hearing Loss   ECV Ear Canal Volume   HA Hearing Aid   NIHL Noise-Induced Hearing Loss   PTA Pure Tone Average   SNHL Sensorineural Hearing Loss   TM Tympanic Membrane   WNL Within Normal Limits

## 2024-10-03 NOTE — PROGRESS NOTES
Subjective   Patient ID: Edna Dawson is a 59 y.o. female who presents for New Patient Visit.  HPI  This patient is referred by rhinology for evaluation of non-pulsatile right-sided tinnitus tinnitus.  The patient is  accompanied by a family member.   When asked about ear pain, hearing loss, discharge from ear, tinnitus, aural fullness or autophony, the patient admits to intermittent right sided tinnitus.  When asked whether the tinnitus interfered with the patient's daily activities or prevented the patient from falling asleep, the patient reported it can be quite bothersome, but does not disrupt sleep or her daily activities.  When asked about a significant past otological history including history of prior ear surgery, noise exposure, exposure to ototoxic drugs or agents, and/or family history of hearing loss, the patient admits to recreational noise exposure and father with presbycusis.  Patient has recently completed physical therapy for significant neck stiffness.  She also endorses dizziness she describes as intermittent lightheadedness.  She denies any autophony, sound induced dizziness or pressure induced dizziness.  She has always been very sensitive to sounds.  She regularly wears ear protection when around loud sounds/music.  She denies any history of TMJ dysfunction or bruxism.    Review of Systems  A comprehensive or 10 points review of the patient's constitutional, neurological, HEENT, pulmonary, cardiovascular and genito-urinary systems showed only those mentioned in history of present illness.    Objective   Physical Exam  Constitutional: no fever, chills, weight loss or weight gain   General appearance: Appears well, well-nourished, well groomed. No acute distress.   Communication: Normal communication   Psychiatric: Oriented to person, place and time. Normal mood and affect.   Neurologic: Cranial nerves II-XII grossly intact and symmetric bilaterally.   Head and Face:   Head: Atraumatic with no  masses, lesions or scarring.   Face: Normal symmetry, no paralysis, synkinesis or facial tic. No scars or deformities.   TMJ: Normal, no trismus.   Eyes: Conjunctiva not edematous or erythematous   Ears: External inspection of ears with no deformity, scars or masses. Bilateral EACs clear and bilateral TMs intact with no signs of effusions   Nose: External inspection of nose: No nasal lesions, lacerations or scars.   Neck: Normal appearing, symmetric, trachea midline.   Cardiovascular: Examination of peripheral vascular system shows no clubbing or cyanosis.   Respiratory: No respiratory distress increased work of breathing. Inspection of the chest with symmetric chest expansion and normal respiratory effort.   Skin: No rashes in the head or neck  My interpretation of the audiogram done today is normal hearing with excellent word recognition scores and normal tympanograms bilaterally.  Assessment/Plan     This patient presents for subsequent evaluation of chronic acquired right subjective tinnitus, neck pain, neck stiffness.    Reassurance given that otologic exam today is normal.  Audiogram was reviewed in detail and is also normal.  I believe that her tinnitus is most likely cervicogenic in origin.  I recommended trying a different physical therapy practice that specializes in these issues.  Patient is in agreement with the plan.  All questions were answered to patient's satisfaction.  If symptoms do not improve, asked that she contact my office.  Otherwise, she may follow-up as needed.    This note was created using speech recognition transcription software. Despite proofreading, several typographical errors might be present that might affect the meaning of the content. Please call with any questions.         VALENTINA Paige 10/03/24 4:55 PM

## 2024-10-15 ENCOUNTER — APPOINTMENT (OUTPATIENT)
Dept: OBSTETRICS AND GYNECOLOGY | Facility: CLINIC | Age: 59
End: 2024-10-15
Payer: COMMERCIAL

## 2024-10-15 DIAGNOSIS — N95.2 VAGINAL ATROPHY: ICD-10-CM

## 2024-10-15 DIAGNOSIS — Z78.0 MENOPAUSE: Primary | ICD-10-CM

## 2024-10-15 PROCEDURE — 99441 PR PHYS/QHP TELEPHONE EVALUATION 5-10 MIN: CPT | Performed by: NURSE PRACTITIONER

## 2024-10-15 RX ORDER — HYDROXYZINE HYDROCHLORIDE 25 MG/1
25 TABLET, FILM COATED ORAL NIGHTLY PRN
COMMUNITY
Start: 2024-09-18

## 2024-10-15 RX ORDER — ESTRADIOL 0.1 MG/G
CREAM VAGINAL
Qty: 42.5 G | Refills: 2 | Status: SHIPPED | OUTPATIENT
Start: 2024-10-15

## 2024-10-15 RX ORDER — ALPRAZOLAM 0.5 MG/1
0.5 TABLET ORAL
COMMUNITY
Start: 2024-09-18

## 2024-10-15 RX ORDER — OXYBUTYNIN CHLORIDE 5 MG/1
5 TABLET ORAL 2 TIMES DAILY
Qty: 90 TABLET | Refills: 11 | Status: SHIPPED | OUTPATIENT
Start: 2024-10-15

## 2024-11-06 ENCOUNTER — APPOINTMENT (OUTPATIENT)
Dept: OBSTETRICS AND GYNECOLOGY | Facility: CLINIC | Age: 59
End: 2024-11-06
Payer: COMMERCIAL

## 2024-11-06 VITALS
WEIGHT: 165 LBS | SYSTOLIC BLOOD PRESSURE: 112 MMHG | HEIGHT: 64 IN | BODY MASS INDEX: 28.17 KG/M2 | DIASTOLIC BLOOD PRESSURE: 74 MMHG

## 2024-11-06 DIAGNOSIS — Z01.419 ENCOUNTER FOR ANNUAL ROUTINE GYNECOLOGICAL EXAMINATION: Primary | ICD-10-CM

## 2024-11-06 DIAGNOSIS — Z12.31 SCREENING MAMMOGRAM FOR BREAST CANCER: ICD-10-CM

## 2024-11-06 PROCEDURE — 3074F SYST BP LT 130 MM HG: CPT | Performed by: OBSTETRICS & GYNECOLOGY

## 2024-11-06 PROCEDURE — 3008F BODY MASS INDEX DOCD: CPT | Performed by: OBSTETRICS & GYNECOLOGY

## 2024-11-06 PROCEDURE — 99396 PREV VISIT EST AGE 40-64: CPT | Performed by: OBSTETRICS & GYNECOLOGY

## 2024-11-06 PROCEDURE — 3078F DIAST BP <80 MM HG: CPT | Performed by: OBSTETRICS & GYNECOLOGY

## 2024-11-06 ASSESSMENT — ENCOUNTER SYMPTOMS
NEUROLOGICAL NEGATIVE: 1
CONSTITUTIONAL NEGATIVE: 0
EYES NEGATIVE: 0
NEUROLOGICAL NEGATIVE: 0
PSYCHIATRIC NEGATIVE: 1
RESPIRATORY NEGATIVE: 0
MUSCULOSKELETAL NEGATIVE: 1
PSYCHIATRIC NEGATIVE: 0
ENDOCRINE NEGATIVE: 0
CONSTITUTIONAL NEGATIVE: 1
GASTROINTESTINAL NEGATIVE: 1
ALLERGIC/IMMUNOLOGIC NEGATIVE: 0
GASTROINTESTINAL NEGATIVE: 0
CARDIOVASCULAR NEGATIVE: 0
EYES NEGATIVE: 1
HEMATOLOGIC/LYMPHATIC NEGATIVE: 0
ENDOCRINE NEGATIVE: 1
MUSCULOSKELETAL NEGATIVE: 0
CARDIOVASCULAR NEGATIVE: 1
RESPIRATORY NEGATIVE: 1

## 2024-11-06 ASSESSMENT — PAIN SCALES - GENERAL: PAINLEVEL_OUTOF10: 0-NO PAIN

## 2024-11-06 NOTE — PROGRESS NOTES
Assessment and Plan:  59 year old female with LS presenting for an annual GYN exam.     Diagnoses:  #1 Routine annual gynecologic exam  #2 Breast cancer screening  #3 Lichen sclerosus     Plan:  1. Routine annual gynecologic exam, breast cancer screening   - Pap smear due in    - Mammogram requisition provided   - Follow up with PCP and other healthcare specialists PRN   - Discussed with patient to continue to exercise and meeting with a  to maintain weight loss goals now that she has been off Ozempic since May 2024     2. LS  - She is to continue follow up with Rad Garcia to manage Lichen sclerosus and hormone therapy     RTC in 1 year or PRN with Dr. Soriano.     Scribe Attestation  By signing my name below, I, Cassi Singh, attest that this documentation has been prepared under the direction and in the presence of Cari Soriano MD on 10/30/2024 at 12:29 PM.         HPI:   Edna Dawson 58 y/o,   woman presents today for annual GYN exam. She has a hx of biopsy proven Lichen sclerosus and is using Estradiol and Clobetasol. She has been taking Oxybutynin for her hot flashes which was prescribed by NP Rad Garcia. She tried to wean herself off of this but noted that the symptoms almost immediately came back. Recent mammograms and MRI were normal. The patient reports that while on vacation in [place] and [place] she had lost track of the days of taking her Estradiol and missed a few and subsequently started experiencing vaginal bleeding for which she had an ultrasound done which was normal. She denies any recurrence of this since then. She also denies any pelvic pain, abnormal discharge or bladder issues. She has a hx of bladder sling procedure so seldomly has issues with this. She also reports that she has been experiencing mild hair loss and followed with a Dermatologist but has not started any medication for this. She had a recent colonoscopy and was found to have small polyps.  Otherwise, she is doing well from a gynecological standpoint.         ROS:  Review of Systems   Constitutional: Negative.    HENT: Negative.     Eyes: Negative.    Respiratory: Negative.     Cardiovascular: Negative.    Gastrointestinal: Negative.    Endocrine: Negative.    Genitourinary: Negative.    Musculoskeletal: Negative.    Neurological: Negative.    Psychiatric/Behavioral: Negative.           Physical Exam:   Physical Exam  Constitutional:       General: She is not in acute distress.     Appearance: Normal appearance.   Genitourinary:      Right Labia: skin changes.      Right Labia: No rash, lesions or Bartholin's cyst.     Left Labia: skin changes.      Left Labia: No lesions, Bartholin's cyst or rash.     Vulva exam comments: Normal appearing external female genitalia, normal Bartholin's and Chuichu's glands bilaterally. There are some notable skin changes consistent with lichen sclerosus, no lesions. .      No vaginal prolapse present.     Mild vaginal atrophy present.       Right Adnexa: not tender and no mass present.     Left Adnexa: not tender and no mass present.     No cervical motion tenderness or lesion.      Uterus is not enlarged, fixed or tender.      No uterine mass detected.     Pelvic exam was performed with patient normal support.   Breasts:     Right: Normal. No inverted nipple, mass, nipple discharge or skin change.      Left: Normal. No inverted nipple, mass, nipple discharge or skin change.   HENT:      Head: Normocephalic and atraumatic.   Neck:      Thyroid: No thyromegaly.   Cardiovascular:      Rate and Rhythm: Normal rate and regular rhythm.      Heart sounds: Normal heart sounds.   Pulmonary:      Effort: Pulmonary effort is normal.      Breath sounds: Normal breath sounds.   Abdominal:      General: There is no distension.      Palpations: Abdomen is soft.      Tenderness: There is no abdominal tenderness.   Musculoskeletal:      Cervical back: Neck supple.   Lymphadenopathy:       Upper Body:      Right upper body: No axillary adenopathy.      Left upper body: No axillary adenopathy.   Neurological:      Mental Status: She is alert and oriented to person, place, and time.   Psychiatric:         Behavior: Behavior normal.         Thought Content: Thought content normal.         Judgment: Judgment normal.

## 2024-11-29 ENCOUNTER — APPOINTMENT (OUTPATIENT)
Dept: AUDIOLOGY | Facility: CLINIC | Age: 59
End: 2024-11-29
Payer: COMMERCIAL

## 2024-11-29 ENCOUNTER — APPOINTMENT (OUTPATIENT)
Dept: OTOLARYNGOLOGY | Facility: CLINIC | Age: 59
End: 2024-11-29
Payer: COMMERCIAL

## 2025-01-31 LAB
ALBUMIN SERPL-MCNC: 4.5 G/DL (ref 3.6–5.1)
ALBUMIN/GLOB SERPL: 1.8 (CALC) (ref 1–2.5)
ALP SERPL-CCNC: 71 U/L (ref 37–153)
ALT SERPL-CCNC: 25 U/L (ref 6–29)
AST SERPL-CCNC: 21 U/L (ref 10–35)
BILIRUB SERPL-MCNC: 0.9 MG/DL (ref 0.2–1.2)
BUN SERPL-MCNC: 15 MG/DL (ref 7–25)
BUN/CREAT SERPL: ABNORMAL (CALC) (ref 6–22)
CALCIUM SERPL-MCNC: 9.7 MG/DL (ref 8.6–10.4)
CHLORIDE SERPL-SCNC: 104 MMOL/L (ref 98–110)
CHOLEST SERPL-MCNC: 198 MG/DL
CHOLEST/HDLC SERPL: 2 (CALC)
CO2 SERPL-SCNC: 26 MMOL/L (ref 20–32)
CREAT SERPL-MCNC: 0.99 MG/DL (ref 0.5–1.05)
EGFRCR SERPLBLD CKD-EPI 2021: 65 ML/MIN/1.73M2
ERYTHROCYTE [DISTWIDTH] IN BLOOD BY AUTOMATED COUNT: 12.2 % (ref 11–15)
GLOBULIN SER CALC-MCNC: 2.5 G/DL (CALC) (ref 1.9–3.7)
GLUCOSE SERPL-MCNC: 110 MG/DL (ref 65–99)
HBA1C MFR BLD: 5.6 % OF TOTAL HGB
HCT VFR BLD AUTO: 40.5 % (ref 35–45)
HDLC SERPL-MCNC: 97 MG/DL
HGB BLD-MCNC: 14 G/DL (ref 11.7–15.5)
LDLC SERPL CALC-MCNC: 78 MG/DL (CALC)
LDLC/HDLC SERPL: 0.8 (CALC)
MCH RBC QN AUTO: 32 PG (ref 27–33)
MCHC RBC AUTO-ENTMCNC: 34.6 G/DL (ref 32–36)
MCV RBC AUTO: 92.5 FL (ref 80–100)
NONHDLC SERPL-MCNC: 101 MG/DL (CALC)
PLATELET # BLD AUTO: 306 THOUSAND/UL (ref 140–400)
PMV BLD REES-ECKER: 10.4 FL (ref 7.5–12.5)
POTASSIUM SERPL-SCNC: 4.1 MMOL/L (ref 3.5–5.3)
PROT SERPL-MCNC: 7 G/DL (ref 6.1–8.1)
RBC # BLD AUTO: 4.38 MILLION/UL (ref 3.8–5.1)
SODIUM SERPL-SCNC: 140 MMOL/L (ref 135–146)
TRIGL SERPL-MCNC: 126 MG/DL
TSH SERPL-ACNC: 1.1 MIU/L (ref 0.4–4.5)
WBC # BLD AUTO: 6.1 THOUSAND/UL (ref 3.8–10.8)

## 2025-02-22 PROCEDURE — 99284 EMERGENCY DEPT VISIT MOD MDM: CPT

## 2025-02-22 ASSESSMENT — PAIN SCALES - GENERAL
PAINLEVEL_OUTOF10: 5 - MODERATE PAIN
PAINLEVEL_OUTOF10: 0 - NO PAIN

## 2025-02-22 ASSESSMENT — PAIN DESCRIPTION - LOCATION: LOCATION: ABDOMEN

## 2025-02-22 ASSESSMENT — PAIN DESCRIPTION - ORIENTATION: ORIENTATION: MID

## 2025-02-22 ASSESSMENT — PAIN DESCRIPTION - PAIN TYPE: TYPE: ACUTE PAIN

## 2025-02-22 ASSESSMENT — PAIN - FUNCTIONAL ASSESSMENT
PAIN_FUNCTIONAL_ASSESSMENT: 0-10
PAIN_FUNCTIONAL_ASSESSMENT: 0-10

## 2025-02-22 ASSESSMENT — PAIN DESCRIPTION - FREQUENCY: FREQUENCY: INTERMITTENT

## 2025-02-22 ASSESSMENT — PAIN DESCRIPTION - DESCRIPTORS: DESCRIPTORS: ACHING

## 2025-02-23 ENCOUNTER — HOSPITAL ENCOUNTER (EMERGENCY)
Facility: HOSPITAL | Age: 60
Discharge: HOME | End: 2025-02-23
Payer: COMMERCIAL

## 2025-02-23 ENCOUNTER — APPOINTMENT (OUTPATIENT)
Dept: CARDIOLOGY | Facility: HOSPITAL | Age: 60
End: 2025-02-23
Payer: COMMERCIAL

## 2025-02-23 VITALS
BODY MASS INDEX: 30.73 KG/M2 | DIASTOLIC BLOOD PRESSURE: 58 MMHG | SYSTOLIC BLOOD PRESSURE: 118 MMHG | TEMPERATURE: 96.8 F | RESPIRATION RATE: 16 BRPM | HEIGHT: 64 IN | WEIGHT: 180 LBS | HEART RATE: 68 BPM | OXYGEN SATURATION: 96 %

## 2025-02-23 DIAGNOSIS — F12.980 CANNABIS USE WITH ANXIETY DISORDER (MULTI): Primary | ICD-10-CM

## 2025-02-23 LAB
ALBUMIN SERPL BCP-MCNC: 4.5 G/DL (ref 3.4–5)
ALP SERPL-CCNC: 59 U/L (ref 33–136)
ALT SERPL W P-5'-P-CCNC: 27 U/L (ref 7–45)
ANION GAP SERPL CALC-SCNC: 16 MMOL/L (ref 10–20)
APPEARANCE UR: CLEAR
AST SERPL W P-5'-P-CCNC: 33 U/L (ref 9–39)
BASOPHILS # BLD AUTO: 0.05 X10*3/UL (ref 0–0.1)
BASOPHILS NFR BLD AUTO: 0.6 %
BILIRUB SERPL-MCNC: 0.6 MG/DL (ref 0–1.2)
BILIRUB UR STRIP.AUTO-MCNC: NEGATIVE MG/DL
BUN SERPL-MCNC: 25 MG/DL (ref 6–23)
CALCIUM SERPL-MCNC: 10 MG/DL (ref 8.6–10.3)
CARDIAC TROPONIN I PNL SERPL HS: <3 NG/L (ref 0–13)
CHLORIDE SERPL-SCNC: 101 MMOL/L (ref 98–107)
CO2 SERPL-SCNC: 23 MMOL/L (ref 21–32)
COLOR UR: NORMAL
CREAT SERPL-MCNC: 1.05 MG/DL (ref 0.5–1.05)
EGFRCR SERPLBLD CKD-EPI 2021: 61 ML/MIN/1.73M*2
EOSINOPHIL # BLD AUTO: 0.13 X10*3/UL (ref 0–0.7)
EOSINOPHIL NFR BLD AUTO: 1.6 %
ERYTHROCYTE [DISTWIDTH] IN BLOOD BY AUTOMATED COUNT: 12.1 % (ref 11.5–14.5)
GLUCOSE SERPL-MCNC: 155 MG/DL (ref 74–99)
GLUCOSE UR STRIP.AUTO-MCNC: NORMAL MG/DL
HCT VFR BLD AUTO: 40.5 % (ref 36–46)
HGB BLD-MCNC: 14.1 G/DL (ref 12–16)
IMM GRANULOCYTES # BLD AUTO: 0.02 X10*3/UL (ref 0–0.7)
IMM GRANULOCYTES NFR BLD AUTO: 0.2 % (ref 0–0.9)
KETONES UR STRIP.AUTO-MCNC: NEGATIVE MG/DL
LEUKOCYTE ESTERASE UR QL STRIP.AUTO: NEGATIVE
LIPASE SERPL-CCNC: 75 U/L (ref 9–82)
LYMPHOCYTES # BLD AUTO: 2.04 X10*3/UL (ref 1.2–4.8)
LYMPHOCYTES NFR BLD AUTO: 24.4 %
MCH RBC QN AUTO: 31.3 PG (ref 26–34)
MCHC RBC AUTO-ENTMCNC: 34.8 G/DL (ref 32–36)
MCV RBC AUTO: 90 FL (ref 80–100)
MONOCYTES # BLD AUTO: 0.45 X10*3/UL (ref 0.1–1)
MONOCYTES NFR BLD AUTO: 5.4 %
NEUTROPHILS # BLD AUTO: 5.68 X10*3/UL (ref 1.2–7.7)
NEUTROPHILS NFR BLD AUTO: 67.8 %
NITRITE UR QL STRIP.AUTO: NEGATIVE
NRBC BLD-RTO: 0 /100 WBCS (ref 0–0)
PH UR STRIP.AUTO: 6.5 [PH]
PLATELET # BLD AUTO: 209 X10*3/UL (ref 150–450)
POTASSIUM SERPL-SCNC: 4 MMOL/L (ref 3.5–5.3)
PROT SERPL-MCNC: 7.4 G/DL (ref 6.4–8.2)
PROT UR STRIP.AUTO-MCNC: NEGATIVE MG/DL
RBC # BLD AUTO: 4.51 X10*6/UL (ref 4–5.2)
RBC # UR STRIP.AUTO: NEGATIVE MG/DL
SODIUM SERPL-SCNC: 136 MMOL/L (ref 136–145)
SP GR UR STRIP.AUTO: 1.02
UROBILINOGEN UR STRIP.AUTO-MCNC: NORMAL MG/DL
WBC # BLD AUTO: 8.4 X10*3/UL (ref 4.4–11.3)

## 2025-02-23 PROCEDURE — 85025 COMPLETE CBC W/AUTO DIFF WBC: CPT | Performed by: PHYSICIAN ASSISTANT

## 2025-02-23 PROCEDURE — 96374 THER/PROPH/DIAG INJ IV PUSH: CPT

## 2025-02-23 PROCEDURE — 80053 COMPREHEN METABOLIC PANEL: CPT | Performed by: PHYSICIAN ASSISTANT

## 2025-02-23 PROCEDURE — 81003 URINALYSIS AUTO W/O SCOPE: CPT | Performed by: PHYSICIAN ASSISTANT

## 2025-02-23 PROCEDURE — 2500000004 HC RX 250 GENERAL PHARMACY W/ HCPCS (ALT 636 FOR OP/ED): Performed by: PHYSICIAN ASSISTANT

## 2025-02-23 PROCEDURE — 36415 COLL VENOUS BLD VENIPUNCTURE: CPT | Performed by: PHYSICIAN ASSISTANT

## 2025-02-23 PROCEDURE — 93005 ELECTROCARDIOGRAM TRACING: CPT

## 2025-02-23 PROCEDURE — 84484 ASSAY OF TROPONIN QUANT: CPT | Performed by: PHYSICIAN ASSISTANT

## 2025-02-23 PROCEDURE — 83690 ASSAY OF LIPASE: CPT | Performed by: PHYSICIAN ASSISTANT

## 2025-02-23 PROCEDURE — 2500000001 HC RX 250 WO HCPCS SELF ADMINISTERED DRUGS (ALT 637 FOR MEDICARE OP): Performed by: PHYSICIAN ASSISTANT

## 2025-02-23 RX ORDER — LORAZEPAM 1 MG/1
1 TABLET ORAL ONCE
Status: COMPLETED | OUTPATIENT
Start: 2025-02-23 | End: 2025-02-23

## 2025-02-23 RX ORDER — ONDANSETRON HYDROCHLORIDE 2 MG/ML
4 INJECTION, SOLUTION INTRAVENOUS ONCE
Status: COMPLETED | OUTPATIENT
Start: 2025-02-23 | End: 2025-02-23

## 2025-02-23 RX ADMIN — ONDANSETRON 4 MG: 2 INJECTION, SOLUTION INTRAMUSCULAR; INTRAVENOUS at 02:16

## 2025-02-23 RX ADMIN — LORAZEPAM 1 MG: 1 TABLET ORAL at 02:16

## 2025-02-23 NOTE — ED PROVIDER NOTES
"Chief Complaint   Patient presents with    Shortness of Breath    Nausea    Abdominal Pain     HPI:   Edna Dawson is an 60 y.o. female with complicated PMH including hypothyroidism, HTN, REHAN/MDD, HLD who presents to the ED with significant other for evaluation of possible panic attack.  Patient states that around  she had half of a square of a bar of cannabis chocolate.  She estimates was around 6 mg.  Shortly after she began to develop tingling in her hands and feet, increased heart rate and felt like she was having a panic attack.  She endorses associated nausea with 2 episodes of nonbloody emesis at home.  Felt lightheaded and had generalized abdominal pain without being able to focally identify any area of her abdomen that hurt.  She said \"she felt panicky\".  Said that she felt short of breath on the driving but when she got to the hospital her shortness of breath resolved.  This has happened once in the past when using THC but that was a long time ago.  She is prescribed THC for her anxiety.  She only took 1 dose of her BuSpar today.  She denies any other drug use.  Denies chest pain, syncope, headache, fever, diarrhea, changes to BMs, dysuria.    Allergies   Allergen Reactions    Ace Inhibitors Cough   :  Past Medical History:   Diagnosis Date    Metabolic syndrome     Dysmetabolic syndrome X    Personal history of other diseases of the digestive system     History of gastroesophageal reflux (GERD)    Personal history of other diseases of the musculoskeletal system and connective tissue     History of arthritis    Personal history of other venous thrombosis and embolism 2015    History of deep venous thrombosis     Past Surgical History:   Procedure Laterality Date    APPENDECTOMY  2013    Appendectomy    BLADDER SURGERY  2015    Bladder Surgery    BREAST BIOPSY       SECTION, CLASSIC  2015     Section    OTHER SURGICAL HISTORY  2019    Dilation and " curettage    OTHER SURGICAL HISTORY  04/03/2019    [place] tooth extraction     Family History   Problem Relation Name Age of Onset    Ovarian cancer Mother      Hypertension Mother      Lymphoma Mother      Hypertension Father      Uterine cancer Maternal Grandmother      Diabetes type II Maternal Grandfather      Other (allergic disorder) Sibling      Cancer Other family history     Heart disease Other family history     Hypertension Other family history       Physical Exam  Vitals and nursing note reviewed.   Constitutional:       General: She is not in acute distress.     Appearance: Normal appearance. She is not ill-appearing or toxic-appearing.      Comments: Anxious appearing.  Elevated BMI   HENT:      Right Ear: External ear normal.      Left Ear: External ear normal.      Mouth/Throat:      Mouth: Mucous membranes are moist.   Eyes:      Pupils: Pupils are equal, round, and reactive to light.   Cardiovascular:      Rate and Rhythm: Normal rate and regular rhythm.      Pulses: Normal pulses.   Pulmonary:      Effort: Pulmonary effort is normal. No respiratory distress.      Breath sounds: Normal breath sounds.   Abdominal:      General: Bowel sounds are normal.      Palpations: Abdomen is soft.      Tenderness: There is no abdominal tenderness. There is no guarding or rebound.   Musculoskeletal:         General: No deformity or signs of injury. Normal range of motion.      Cervical back: Normal range of motion.   Skin:     General: Skin is warm and dry.      Findings: No bruising.   Neurological:      General: No focal deficit present.      Mental Status: She is alert.      Cranial Nerves: No cranial nerve deficit.   Psychiatric:         Mood and Affect: Mood is anxious.         Behavior: Behavior normal.     VS: As documented in the triage note and EMR flowsheet from this visit were reviewed.    EKG INTERPRETATION:      Personally Reviewed      Rhythm: NSR with sinus arrhythmia      Rate:   69 bpm     Axis:  Normal axis      Intervals:  Normal DE interval 120 ms     QRS Complex:  Normal      ST Segment:  Normal ST-T segments      QT Interval: QTc 424 ms        Medical Decision Making:   ED Course as of 02/23/25 0354   Sun Feb 23, 2025 0102 Vitals Reviewed: Afebrile. Normotensive. Not tachycardic nor tachypneic. No hypoxia.   [KA]   0122 Patient is 60-year-old female that presents to the ED for evaluation of anxiety after using a cannabis edible.  On exam she is anxious appearing.  Her vitals are normal.  She has symmetric pulses.  No focal deficits.  Lungs are clear.  Abdomen is soft and nontender.  We discussed obtaining blood work, CT imaging.  Through shared decision making decided to obtain labs and ECG and decided against CT imaging.  Her abdominal exam is benign and I have low suspicion for acute intra-abdominal surgical pathology.  Patient to be given Zofran and Ativan.  Will reassess. [KA]   0342 I personally viewed labs.  Urine without signs of infection.  Lipase is normal.  CMP shows normal electrolytes although potassium is mildly hemolyzed at 4.0.  BUN slightly elevated, BUN to creatinine ratio 23.  LFTs normal.  CBC without abnormalities.  Troponin normal. [KA]   0343 Discussed results with patient.  She says she is still anxious but she feels better.   at bedside says she looks a lot better.  Patient has as needed Xanax at home.  She is asking if she can still take 1 when she gets home.  Told her if after a hot shower she still could not sleep it would be okay to take 1 dose of Xanax.  She said she will call her physician on Monday for follow-up.  I recommended that she try cannabis that is higher in CBD and has little to no THC to see if that may make a difference.  Encourage patient to return to ED for any new or worsening symptoms.  She is agreeable. [KA]      ED Course User Index  [KA] Ruth Manning PA-C         Diagnoses as of 02/23/25 0354   Cannabis use with anxiety disorder (Multi)       Escalation of Care: Appropriate for outpatient management   Counseling: Spoke with the patient and discussed today´s findings, in addition to providing specific details for the plan of care and expected course.  Patient was given the opportunity to ask questions.    Discussed return precautions and importance of follow-up.  Advised to follow-up with PCP or mental health provider.  Advised to return to the ED for changing or worsening symptoms, new symptoms, complaint specific precautions, and precautions listed on the discharge paperwork.  Educated on the common potential side effects of medications prescribed.    I advised the patient that the emergency evaluation and treatment provided today doesn't end their need for medical care. It is very important that they follow-up with their primary care provider or other specialist as instructed.    The plan of care was mutually agreed upon with the patient. The patient and/or family were given the opportunity to ask questions. All questions asked today in the ED were answered to the best of my ability with today's information.    I specifically advised the patient to return to the ED for changing or worsening symptoms, worrisome new symptoms, or for any complaint specific precautions listed on the discharge paperwork.    This patient was cared for in the setting of nationwide stress on resources and staffing.    This report was transcribed using voice recognition software.  Every effort was made to ensure accuracy, however, inadvertently computerized transcription errors may be present.       Ruth Manning PA-C  02/23/25 0354

## 2025-02-23 NOTE — DISCHARGE INSTRUCTIONS
Please follow-up with primary care doctor within the next 2 to 5 days.  Recommend changing cannabis to be higher in CBD and have little to no THC.  Return to ER for any new or worsening symptoms.

## 2025-02-23 NOTE — ED TRIAGE NOTES
Pt with c/o shortness of breath, abdominal pain and nausea started after taking edible (5-6 mg thc) around 8 pm. Pt stated that she has anxiety. Pt denies cp.

## 2025-02-28 LAB
ATRIAL RATE: 69 BPM
P AXIS: 68 DEGREES
P OFFSET: 213 MS
P ONSET: 168 MS
PR INTERVAL: 120 MS
Q ONSET: 228 MS
QRS COUNT: 12 BEATS
QRS DURATION: 80 MS
QT INTERVAL: 396 MS
QTC CALCULATION(BAZETT): 424 MS
QTC FREDERICIA: 415 MS
R AXIS: 26 DEGREES
T AXIS: 61 DEGREES
T OFFSET: 426 MS
VENTRICULAR RATE: 69 BPM

## 2025-03-05 DIAGNOSIS — M54.50 LOW BACK PAIN, UNSPECIFIED BACK PAIN LATERALITY, UNSPECIFIED CHRONICITY, UNSPECIFIED WHETHER SCIATICA PRESENT: ICD-10-CM

## 2025-03-17 ENCOUNTER — TELEPHONE (OUTPATIENT)
Dept: OBSTETRICS AND GYNECOLOGY | Facility: CLINIC | Age: 60
End: 2025-03-17
Payer: COMMERCIAL

## 2025-03-18 DIAGNOSIS — Z78.0 MENOPAUSE: ICD-10-CM

## 2025-03-18 RX ORDER — OXYBUTYNIN CHLORIDE 5 MG/1
5 TABLET ORAL 2 TIMES DAILY
Qty: 90 TABLET | Refills: 11 | Status: SHIPPED | OUTPATIENT
Start: 2025-03-18

## 2025-05-07 ENCOUNTER — HOSPITAL ENCOUNTER (EMERGENCY)
Facility: HOSPITAL | Age: 60
Discharge: HOME | End: 2025-05-07
Payer: COMMERCIAL

## 2025-05-07 VITALS
WEIGHT: 174 LBS | HEART RATE: 55 BPM | BODY MASS INDEX: 29.71 KG/M2 | OXYGEN SATURATION: 95 % | TEMPERATURE: 98.1 F | HEIGHT: 64 IN | SYSTOLIC BLOOD PRESSURE: 125 MMHG | DIASTOLIC BLOOD PRESSURE: 76 MMHG | RESPIRATION RATE: 16 BRPM

## 2025-05-07 DIAGNOSIS — F41.0 PANIC ATTACK: Primary | ICD-10-CM

## 2025-05-07 LAB
ALBUMIN SERPL BCP-MCNC: 4.5 G/DL (ref 3.4–5)
ALP SERPL-CCNC: 51 U/L (ref 33–136)
ALT SERPL W P-5'-P-CCNC: 29 U/L (ref 7–45)
ANION GAP SERPL CALC-SCNC: 17 MMOL/L (ref 10–20)
APPEARANCE UR: CLEAR
AST SERPL W P-5'-P-CCNC: 21 U/L (ref 9–39)
BASOPHILS # BLD AUTO: 0.04 X10*3/UL (ref 0–0.1)
BASOPHILS NFR BLD AUTO: 0.5 %
BILIRUB SERPL-MCNC: 0.7 MG/DL (ref 0–1.2)
BILIRUB UR STRIP.AUTO-MCNC: NEGATIVE MG/DL
BNP SERPL-MCNC: 60 PG/ML (ref 0–99)
BUN SERPL-MCNC: 16 MG/DL (ref 6–23)
CALCIUM SERPL-MCNC: 10 MG/DL (ref 8.6–10.3)
CARDIAC TROPONIN I PNL SERPL HS: 3 NG/L (ref 0–13)
CHLORIDE SERPL-SCNC: 103 MMOL/L (ref 98–107)
CO2 SERPL-SCNC: 20 MMOL/L (ref 21–32)
COLOR UR: NORMAL
CREAT SERPL-MCNC: 0.99 MG/DL (ref 0.5–1.05)
EGFRCR SERPLBLD CKD-EPI 2021: 65 ML/MIN/1.73M*2
EOSINOPHIL # BLD AUTO: 0.19 X10*3/UL (ref 0–0.7)
EOSINOPHIL NFR BLD AUTO: 2.6 %
ERYTHROCYTE [DISTWIDTH] IN BLOOD BY AUTOMATED COUNT: 13 % (ref 11.5–14.5)
GLUCOSE SERPL-MCNC: 123 MG/DL (ref 74–99)
GLUCOSE UR STRIP.AUTO-MCNC: NORMAL MG/DL
HCT VFR BLD AUTO: 39.3 % (ref 36–46)
HGB BLD-MCNC: 13.9 G/DL (ref 12–16)
IMM GRANULOCYTES # BLD AUTO: 0.01 X10*3/UL (ref 0–0.7)
IMM GRANULOCYTES NFR BLD AUTO: 0.1 % (ref 0–0.9)
KETONES UR STRIP.AUTO-MCNC: NEGATIVE MG/DL
LEUKOCYTE ESTERASE UR QL STRIP.AUTO: NEGATIVE
LYMPHOCYTES # BLD AUTO: 3.14 X10*3/UL (ref 1.2–4.8)
LYMPHOCYTES NFR BLD AUTO: 42.3 %
MAGNESIUM SERPL-MCNC: 1.97 MG/DL (ref 1.6–2.4)
MCH RBC QN AUTO: 31.4 PG (ref 26–34)
MCHC RBC AUTO-ENTMCNC: 35.4 G/DL (ref 32–36)
MCV RBC AUTO: 89 FL (ref 80–100)
MONOCYTES # BLD AUTO: 0.45 X10*3/UL (ref 0.1–1)
MONOCYTES NFR BLD AUTO: 6.1 %
NEUTROPHILS # BLD AUTO: 3.6 X10*3/UL (ref 1.2–7.7)
NEUTROPHILS NFR BLD AUTO: 48.4 %
NITRITE UR QL STRIP.AUTO: NEGATIVE
NRBC BLD-RTO: 0 /100 WBCS (ref 0–0)
PH UR STRIP.AUTO: 6.5 [PH]
PLATELET # BLD AUTO: 282 X10*3/UL (ref 150–450)
POTASSIUM SERPL-SCNC: 3.4 MMOL/L (ref 3.5–5.3)
PROT SERPL-MCNC: 6.8 G/DL (ref 6.4–8.2)
PROT UR STRIP.AUTO-MCNC: NEGATIVE MG/DL
RBC # BLD AUTO: 4.42 X10*6/UL (ref 4–5.2)
RBC # UR STRIP.AUTO: NEGATIVE MG/DL
SODIUM SERPL-SCNC: 137 MMOL/L (ref 136–145)
SP GR UR STRIP.AUTO: 1.01
TSH SERPL-ACNC: 1.5 MIU/L (ref 0.44–3.98)
UROBILINOGEN UR STRIP.AUTO-MCNC: NORMAL MG/DL
WBC # BLD AUTO: 7.4 X10*3/UL (ref 4.4–11.3)

## 2025-05-07 PROCEDURE — 83880 ASSAY OF NATRIURETIC PEPTIDE: CPT | Performed by: SURGERY

## 2025-05-07 PROCEDURE — 96374 THER/PROPH/DIAG INJ IV PUSH: CPT

## 2025-05-07 PROCEDURE — 2500000002 HC RX 250 W HCPCS SELF ADMINISTERED DRUGS (ALT 637 FOR MEDICARE OP, ALT 636 FOR OP/ED): Performed by: SURGERY

## 2025-05-07 PROCEDURE — 2500000004 HC RX 250 GENERAL PHARMACY W/ HCPCS (ALT 636 FOR OP/ED): Performed by: SURGERY

## 2025-05-07 PROCEDURE — 83735 ASSAY OF MAGNESIUM: CPT | Performed by: SURGERY

## 2025-05-07 PROCEDURE — 36415 COLL VENOUS BLD VENIPUNCTURE: CPT | Performed by: SURGERY

## 2025-05-07 PROCEDURE — 81003 URINALYSIS AUTO W/O SCOPE: CPT | Performed by: SURGERY

## 2025-05-07 PROCEDURE — 85025 COMPLETE CBC W/AUTO DIFF WBC: CPT | Performed by: SURGERY

## 2025-05-07 PROCEDURE — 84443 ASSAY THYROID STIM HORMONE: CPT | Performed by: SURGERY

## 2025-05-07 PROCEDURE — 80053 COMPREHEN METABOLIC PANEL: CPT | Performed by: SURGERY

## 2025-05-07 PROCEDURE — 84484 ASSAY OF TROPONIN QUANT: CPT | Performed by: SURGERY

## 2025-05-07 PROCEDURE — 99284 EMERGENCY DEPT VISIT MOD MDM: CPT

## 2025-05-07 RX ORDER — POTASSIUM CHLORIDE 20 MEQ/1
10 TABLET, EXTENDED RELEASE ORAL ONCE
Status: COMPLETED | OUTPATIENT
Start: 2025-05-07 | End: 2025-05-07

## 2025-05-07 RX ORDER — LORAZEPAM 2 MG/ML
2 INJECTION INTRAMUSCULAR ONCE
Status: COMPLETED | OUTPATIENT
Start: 2025-05-07 | End: 2025-05-07

## 2025-05-07 RX ADMIN — LORAZEPAM 2 MG: 2 INJECTION INTRAMUSCULAR; INTRAVENOUS at 03:39

## 2025-05-07 RX ADMIN — POTASSIUM CHLORIDE 10 MEQ: 1500 TABLET, EXTENDED RELEASE ORAL at 04:49

## 2025-05-07 ASSESSMENT — PAIN - FUNCTIONAL ASSESSMENT: PAIN_FUNCTIONAL_ASSESSMENT: 0-10

## 2025-05-07 ASSESSMENT — PAIN SCALES - GENERAL: PAINLEVEL_OUTOF10: 0 - NO PAIN

## 2025-05-07 NOTE — ED TRIAGE NOTES
"Pt to ed w c/o nausea, urinary frequency, \"feeling tingly\", anxiety. Pt states she took a xanax lats evening at 1800 and hydroxyzine at 2200 last evening. Pt states she takes carvedilol and hydrochlorothiazide for her BP as well as Zoloft.   "

## 2025-05-07 NOTE — ED PROVIDER NOTES
Chief Complaint   Patient presents with    Panic Attack     HPI:   Donna Desai is an 60 y.o. female with a history of anxiety presenting to the ED for chief complaint of anxiety.  Patient explains she has not slept well for the last 3 nights and this evening she was having a panic attack that she could not relieve with her home medications.  Explains that this morning she tried to take hydroxyzine which did not provide her relief.  Explains that again this evening she called her PCP who recommended she take a Xanax and to hydroxyzine which assist did not help.  She reports that her hands feel tingly.  She also reports associated urinary frequency but no urgency or dysuria. This has happened in the past and she explains it was a UTI.  She explains she has recently started taking Zoloft about 2 weeks ago and has been trialing different medications for her anxiety.    RX Allergies[1]:  Medical History[2]  Surgical History[3]  Family History[4]     Physical Exam  Vitals and nursing note reviewed.   Constitutional:       General: She is not in acute distress.     Appearance: She is well-developed.   HENT:      Head: Normocephalic and atraumatic.      Right Ear: Tympanic membrane and external ear normal.      Left Ear: Tympanic membrane and external ear normal.      Nose: Nose normal.      Mouth/Throat:      Mouth: Mucous membranes are moist.      Pharynx: Oropharynx is clear.   Eyes:      Extraocular Movements: Extraocular movements intact.      Conjunctiva/sclera: Conjunctivae normal.      Pupils: Pupils are equal, round, and reactive to light.   Cardiovascular:      Rate and Rhythm: Normal rate and regular rhythm.      Heart sounds: Normal heart sounds. No murmur heard.  Pulmonary:      Effort: Pulmonary effort is normal. No respiratory distress.      Breath sounds: Normal breath sounds.   Abdominal:      Palpations: Abdomen is soft.      Tenderness: There is no abdominal tenderness. There is no guarding or rebound.    Musculoskeletal:         General: No swelling.      Cervical back: Normal range of motion and neck supple. No rigidity or tenderness.   Skin:     General: Skin is warm and dry.      Capillary Refill: Capillary refill takes less than 2 seconds.   Neurological:      General: No focal deficit present.      Mental Status: She is alert and oriented to person, place, and time.   Psychiatric:         Mood and Affect: Mood normal.        VS: As documented in the triage note and EMR flowsheet from this visit were reviewed.    EKG: Sinus rhythm at 60 bpm R wave progression no bundle branch block no ST segment abnormalities normal axis      Medical Decision Making: This is a 16-year-old female with a history of anxiety presenting to the ED for concerns of a panic attack.  On my initial evaluation her vitals are stable.  She is a good historian.  She is answering questions appropriately.  No SI or HI.  She had no neurological deficits.  Her heart rate and rhythm was regular and her lungs were clear.  Abdomen was benign.  Her lab work was reassuring.  There is no evidence of leukocytosis kidney and liver function was normal.  Her TSH was normal limits.  Her urinalysis was clean.  She was treated with 2 mg of Ativan and felt significant relief of her symptoms.  She is appropriate for outpatient management and discharged in stable condition in the custody of her  who understands return precautions.  Differential includes thyroid toxicosis, anxiety, MI, ACS, SI, stroke, UTI  Diagnoses as of 05/07/25 0449   Panic attack     Counseling: Spoke with the patient and discussed today´s findings, in addition to providing specific details for the plan of care and expected course.  Patient was given the opportunity to ask questions.    Discussed return precautions and importance of follow-up.  Advised to follow-up with PCP.  I specifically advised to return to the ED for changing or worsening symptoms, new symptoms, complaint  specific precautions, and precautions listed on the discharge paperwork.  Educated on the common potential side effects of medications prescribed.    I advised the patient that the emergency evaluation and treatment provided today doesn't end their need for medical care. It is very important that they follow-up with their primary care provider or other specialist as instructed.    The plan of care was mutually agreed upon with the patient. The patient and/or family were given the opportunity to ask questions. All questions asked today in the ED were answered to the best of my ability with today's information.    This report was transcribed using voice recognition software.  Every effort was made to ensure accuracy, however, inadvertently computerized transcription errors may be present.         [1]   Allergies  Allergen Reactions    Ace Inhibitors Cough   [2] No past medical history on file.  [3] No past surgical history on file.  [4] No family history on file.       Nhan Rivera PA-C  05/07/25 0593

## 2025-05-07 NOTE — DISCHARGE INSTRUCTIONS
You have been seen at a [facility].  Please follow-up with your primary care provider in the next 1 to 2 days for further evaluation and routine follow-up.  Please return to the emergency room if having any worsening symptoms.  Please follow-up with any specialists if discussed during your emergency room stay.

## 2025-05-08 ENCOUNTER — HOSPITAL ENCOUNTER (OUTPATIENT)
Dept: CARDIOLOGY | Facility: HOSPITAL | Age: 60
Discharge: HOME | End: 2025-05-08
Payer: COMMERCIAL

## 2025-05-08 DIAGNOSIS — N89.8 VAGINAL IRRITATION: ICD-10-CM

## 2025-05-08 PROCEDURE — 93005 ELECTROCARDIOGRAM TRACING: CPT

## 2025-05-09 RX ORDER — CLOBETASOL PROPIONATE 0.5 MG/G
CREAM TOPICAL 3 TIMES WEEKLY
Qty: 30 G | Refills: 3 | Status: SHIPPED | OUTPATIENT
Start: 2025-05-09

## 2025-05-20 DIAGNOSIS — N95.2 VAGINAL ATROPHY: ICD-10-CM

## 2025-05-20 DIAGNOSIS — R04.0 EPISTAXIS: ICD-10-CM

## 2025-05-20 RX ORDER — ESTRADIOL 0.1 MG/G
CREAM VAGINAL
Qty: 42.5 G | Refills: 2 | Status: SHIPPED | OUTPATIENT
Start: 2025-05-20

## 2025-05-20 RX ORDER — MUPIROCIN 20 MG/G
OINTMENT TOPICAL
Qty: 30 G | Refills: 0 | Status: SHIPPED | OUTPATIENT
Start: 2025-05-20

## 2025-05-20 NOTE — TELEPHONE ENCOUNTER
Patient contacted office for refill of Mupirocin ointment. Patient was last seen in office September 2024. Patient advised Dr. Simental will send in 1 refill and for future refills she is to schedule an appointment for a reassessment. Office number relayed to patient if needed for scheduling.

## 2025-05-21 LAB
ATRIAL RATE: 60 BPM
P AXIS: 35 DEGREES
P OFFSET: 217 MS
P ONSET: 184 MS
PR INTERVAL: 90 MS
Q ONSET: 229 MS
QRS COUNT: 10 BEATS
QRS DURATION: 70 MS
QT INTERVAL: 438 MS
QTC CALCULATION(BAZETT): 438 MS
QTC FREDERICIA: 438 MS
R AXIS: 20 DEGREES
T AXIS: 23 DEGREES
T OFFSET: 448 MS
VENTRICULAR RATE: 60 BPM

## 2025-07-24 ENCOUNTER — APPOINTMENT (OUTPATIENT)
Dept: RADIOLOGY | Facility: CLINIC | Age: 60
End: 2025-07-24
Payer: COMMERCIAL

## 2025-07-24 VITALS — WEIGHT: 179.9 LBS | BODY MASS INDEX: 30.71 KG/M2 | HEIGHT: 64 IN

## 2025-07-24 DIAGNOSIS — Z12.31 SCREENING MAMMOGRAM FOR BREAST CANCER: ICD-10-CM

## 2025-07-24 PROCEDURE — 77067 SCR MAMMO BI INCL CAD: CPT

## 2025-07-24 PROCEDURE — 77067 SCR MAMMO BI INCL CAD: CPT | Performed by: STUDENT IN AN ORGANIZED HEALTH CARE EDUCATION/TRAINING PROGRAM

## 2025-07-24 PROCEDURE — 77063 BREAST TOMOSYNTHESIS BI: CPT | Performed by: STUDENT IN AN ORGANIZED HEALTH CARE EDUCATION/TRAINING PROGRAM

## 2025-07-25 DIAGNOSIS — R92.30 DENSE BREAST TISSUE: ICD-10-CM

## 2025-08-05 ENCOUNTER — APPOINTMENT (OUTPATIENT)
Dept: RADIOLOGY | Facility: HOSPITAL | Age: 60
End: 2025-08-05

## 2025-08-20 ENCOUNTER — APPOINTMENT (OUTPATIENT)
Dept: RADIOLOGY | Facility: HOSPITAL | Age: 60
End: 2025-08-20

## 2025-08-25 ENCOUNTER — APPOINTMENT (OUTPATIENT)
Dept: OTOLARYNGOLOGY | Facility: CLINIC | Age: 60
End: 2025-08-25
Payer: COMMERCIAL

## 2025-08-28 ENCOUNTER — APPOINTMENT (OUTPATIENT)
Dept: OTOLARYNGOLOGY | Facility: CLINIC | Age: 60
End: 2025-08-28
Payer: COMMERCIAL

## 2025-09-16 ENCOUNTER — APPOINTMENT (OUTPATIENT)
Dept: RADIOLOGY | Facility: HOSPITAL | Age: 60
End: 2025-09-16

## 2025-09-30 ENCOUNTER — APPOINTMENT (OUTPATIENT)
Dept: OTOLARYNGOLOGY | Facility: CLINIC | Age: 60
End: 2025-09-30
Payer: COMMERCIAL